# Patient Record
Sex: FEMALE | Race: WHITE | Employment: OTHER | URBAN - METROPOLITAN AREA
[De-identification: names, ages, dates, MRNs, and addresses within clinical notes are randomized per-mention and may not be internally consistent; named-entity substitution may affect disease eponyms.]

---

## 2017-01-20 ENCOUNTER — OFFICE VISIT (OUTPATIENT)
Dept: FAMILY MEDICINE CLINIC | Age: 62
End: 2017-01-20

## 2017-01-20 VITALS
SYSTOLIC BLOOD PRESSURE: 140 MMHG | DIASTOLIC BLOOD PRESSURE: 70 MMHG | RESPIRATION RATE: 16 BRPM | HEIGHT: 61 IN | BODY MASS INDEX: 26.43 KG/M2 | TEMPERATURE: 97.4 F | WEIGHT: 140 LBS | HEART RATE: 70 BPM

## 2017-01-20 DIAGNOSIS — E03.9 HYPOTHYROIDISM, UNSPECIFIED TYPE: ICD-10-CM

## 2017-01-20 DIAGNOSIS — Z12.31 ENCOUNTER FOR SCREENING MAMMOGRAM FOR BREAST CANCER: ICD-10-CM

## 2017-01-20 DIAGNOSIS — I10 ESSENTIAL HYPERTENSION: ICD-10-CM

## 2017-01-20 DIAGNOSIS — Z12.4 CERVICAL CANCER SCREENING: ICD-10-CM

## 2017-01-20 DIAGNOSIS — Z12.4 CERVICAL CANCER SCREENING: Primary | ICD-10-CM

## 2017-01-20 PROCEDURE — 99396 PREV VISIT EST AGE 40-64: CPT | Performed by: FAMILY MEDICINE

## 2017-01-20 RX ORDER — AMMONIUM LACTATE 12 G/100G
LOTION TOPICAL
Qty: 1 BOTTLE | Refills: 3 | Status: SHIPPED | OUTPATIENT
Start: 2017-01-20 | End: 2019-03-18

## 2017-01-20 ASSESSMENT — PATIENT HEALTH QUESTIONNAIRE - PHQ9
2. FEELING DOWN, DEPRESSED OR HOPELESS: 0
1. LITTLE INTEREST OR PLEASURE IN DOING THINGS: 0
SUM OF ALL RESPONSES TO PHQ QUESTIONS 1-9: 0
SUM OF ALL RESPONSES TO PHQ9 QUESTIONS 1 & 2: 0

## 2017-02-01 ENCOUNTER — HOSPITAL ENCOUNTER (OUTPATIENT)
Dept: WOMENS IMAGING | Age: 62
Discharge: HOME | End: 2017-02-01
Payer: COMMERCIAL

## 2017-02-01 DIAGNOSIS — Z12.31 ENCOUNTER FOR SCREENING MAMMOGRAM FOR BREAST CANCER: ICD-10-CM

## 2017-02-01 PROCEDURE — G0202 SCR MAMMO BI INCL CAD: HCPCS

## 2018-03-16 ENCOUNTER — OFFICE VISIT (OUTPATIENT)
Dept: FAMILY MEDICINE CLINIC | Age: 63
End: 2018-03-16
Payer: COMMERCIAL

## 2018-03-16 VITALS
WEIGHT: 157 LBS | SYSTOLIC BLOOD PRESSURE: 132 MMHG | HEART RATE: 76 BPM | BODY MASS INDEX: 29.64 KG/M2 | TEMPERATURE: 97.9 F | HEIGHT: 61 IN | RESPIRATION RATE: 16 BRPM | DIASTOLIC BLOOD PRESSURE: 66 MMHG

## 2018-03-16 DIAGNOSIS — Z12.4 CERVICAL CANCER SCREENING: ICD-10-CM

## 2018-03-16 DIAGNOSIS — I10 ESSENTIAL HYPERTENSION: Primary | ICD-10-CM

## 2018-03-16 DIAGNOSIS — E03.9 HYPOTHYROIDISM, UNSPECIFIED TYPE: ICD-10-CM

## 2018-03-16 DIAGNOSIS — Z12.31 ENCOUNTER FOR SCREENING MAMMOGRAM FOR BREAST CANCER: ICD-10-CM

## 2018-03-16 PROCEDURE — 99396 PREV VISIT EST AGE 40-64: CPT | Performed by: FAMILY MEDICINE

## 2018-03-16 ASSESSMENT — PATIENT HEALTH QUESTIONNAIRE - PHQ9
1. LITTLE INTEREST OR PLEASURE IN DOING THINGS: 0
SUM OF ALL RESPONSES TO PHQ QUESTIONS 1-9: 0
2. FEELING DOWN, DEPRESSED OR HOPELESS: 0
SUM OF ALL RESPONSES TO PHQ9 QUESTIONS 1 & 2: 0

## 2018-03-23 LAB
HPV COMMENT: NORMAL
HPV TYPE 16: NOT DETECTED
HPV TYPE 18: NOT DETECTED
HPVOH (OTHER TYPES): NOT DETECTED

## 2018-03-26 ENCOUNTER — HOSPITAL ENCOUNTER (OUTPATIENT)
Dept: WOMENS IMAGING | Age: 63
Discharge: HOME | End: 2018-03-28
Payer: COMMERCIAL

## 2018-03-26 DIAGNOSIS — Z12.31 ENCOUNTER FOR SCREENING MAMMOGRAM FOR BREAST CANCER: ICD-10-CM

## 2018-03-26 PROCEDURE — 77067 SCR MAMMO BI INCL CAD: CPT

## 2018-03-28 PROCEDURE — 2580000003 HC RX 258

## 2018-11-19 ENCOUNTER — OFFICE VISIT (OUTPATIENT)
Dept: FAMILY MEDICINE CLINIC | Age: 63
End: 2018-11-19
Payer: COMMERCIAL

## 2018-11-19 VITALS
TEMPERATURE: 98.2 F | BODY MASS INDEX: 26.06 KG/M2 | HEIGHT: 61 IN | OXYGEN SATURATION: 98 % | RESPIRATION RATE: 15 BRPM | HEART RATE: 77 BPM | SYSTOLIC BLOOD PRESSURE: 126 MMHG | WEIGHT: 138 LBS | DIASTOLIC BLOOD PRESSURE: 74 MMHG

## 2018-11-19 DIAGNOSIS — J40 BRONCHITIS: Primary | ICD-10-CM

## 2018-11-19 PROCEDURE — G8484 FLU IMMUNIZE NO ADMIN: HCPCS | Performed by: NURSE PRACTITIONER

## 2018-11-19 PROCEDURE — 3017F COLORECTAL CA SCREEN DOC REV: CPT | Performed by: NURSE PRACTITIONER

## 2018-11-19 PROCEDURE — 1036F TOBACCO NON-USER: CPT | Performed by: NURSE PRACTITIONER

## 2018-11-19 PROCEDURE — G8427 DOCREV CUR MEDS BY ELIG CLIN: HCPCS | Performed by: NURSE PRACTITIONER

## 2018-11-19 PROCEDURE — 99213 OFFICE O/P EST LOW 20 MIN: CPT | Performed by: NURSE PRACTITIONER

## 2018-11-19 PROCEDURE — G8419 CALC BMI OUT NRM PARAM NOF/U: HCPCS | Performed by: NURSE PRACTITIONER

## 2018-11-19 RX ORDER — CEFDINIR 300 MG/1
600 CAPSULE ORAL DAILY
Qty: 20 CAPSULE | Refills: 0 | Status: SHIPPED | OUTPATIENT
Start: 2018-11-19 | End: 2018-11-29

## 2018-11-19 RX ORDER — DEXTROMETHORPHAN HYDROBROMIDE AND PROMETHAZINE HYDROCHLORIDE 15; 6.25 MG/5ML; MG/5ML
5 SYRUP ORAL 4 TIMES DAILY PRN
Qty: 118 ML | Refills: 0 | Status: SHIPPED | OUTPATIENT
Start: 2018-11-19 | End: 2018-11-26

## 2018-11-19 RX ORDER — PREDNISONE 20 MG/1
40 TABLET ORAL DAILY
Qty: 10 TABLET | Refills: 0 | Status: SHIPPED | OUTPATIENT
Start: 2018-11-19 | End: 2018-11-24

## 2018-11-25 ASSESSMENT — ENCOUNTER SYMPTOMS
HEMOPTYSIS: 0
SHORTNESS OF BREATH: 0
HEARTBURN: 0
WHEEZING: 1
SORE THROAT: 1
RHINORRHEA: 0
COUGH: 1

## 2019-03-18 ENCOUNTER — OFFICE VISIT (OUTPATIENT)
Dept: FAMILY MEDICINE CLINIC | Age: 64
End: 2019-03-18
Payer: COMMERCIAL

## 2019-03-18 VITALS
BODY MASS INDEX: 26.81 KG/M2 | RESPIRATION RATE: 16 BRPM | DIASTOLIC BLOOD PRESSURE: 78 MMHG | HEIGHT: 61 IN | TEMPERATURE: 98.1 F | SYSTOLIC BLOOD PRESSURE: 166 MMHG | HEART RATE: 84 BPM | WEIGHT: 142 LBS

## 2019-03-18 DIAGNOSIS — Z12.31 ENCOUNTER FOR SCREENING MAMMOGRAM FOR BREAST CANCER: ICD-10-CM

## 2019-03-18 DIAGNOSIS — I10 ESSENTIAL HYPERTENSION: Primary | ICD-10-CM

## 2019-03-18 DIAGNOSIS — E03.9 HYPOTHYROIDISM, UNSPECIFIED TYPE: ICD-10-CM

## 2019-03-18 DIAGNOSIS — Z12.4 CERVICAL CANCER SCREENING: ICD-10-CM

## 2019-03-18 DIAGNOSIS — E55.9 VITAMIN D DEFICIENCY: ICD-10-CM

## 2019-03-18 DIAGNOSIS — R73.9 HYPERGLYCEMIA: ICD-10-CM

## 2019-03-18 PROCEDURE — 99396 PREV VISIT EST AGE 40-64: CPT | Performed by: FAMILY MEDICINE

## 2019-03-18 PROCEDURE — G8484 FLU IMMUNIZE NO ADMIN: HCPCS | Performed by: FAMILY MEDICINE

## 2019-03-18 RX ORDER — MULTIVIT-MIN/IRON/FOLIC ACID/K 18-600-40
CAPSULE ORAL
COMMUNITY

## 2019-03-19 DIAGNOSIS — E55.9 VITAMIN D DEFICIENCY: ICD-10-CM

## 2019-03-19 LAB — VITAMIN D 25-HYDROXY: 36.9 NG/ML (ref 30–100)

## 2019-03-29 ENCOUNTER — TELEPHONE (OUTPATIENT)
Dept: FAMILY MEDICINE CLINIC | Age: 64
End: 2019-03-29

## 2019-03-29 ENCOUNTER — HOSPITAL ENCOUNTER (OUTPATIENT)
Dept: WOMENS IMAGING | Age: 64
Discharge: HOME | End: 2019-03-31
Payer: COMMERCIAL

## 2019-03-29 DIAGNOSIS — Z12.31 ENCOUNTER FOR SCREENING MAMMOGRAM FOR BREAST CANCER: ICD-10-CM

## 2019-03-29 PROCEDURE — 77067 SCR MAMMO BI INCL CAD: CPT

## 2019-09-13 ENCOUNTER — TELEPHONE (OUTPATIENT)
Dept: ADMINISTRATIVE | Age: 64
End: 2019-09-13

## 2020-07-08 ENCOUNTER — HOSPITAL ENCOUNTER (OUTPATIENT)
Dept: WOMENS IMAGING | Age: 65
Discharge: HOME | End: 2020-07-10
Payer: MEDICARE

## 2020-07-08 PROCEDURE — 77067 SCR MAMMO BI INCL CAD: CPT

## 2021-08-14 ENCOUNTER — HOSPITAL ENCOUNTER (OUTPATIENT)
Dept: WOMENS IMAGING | Age: 66
Discharge: HOME | End: 2021-08-16
Payer: MEDICARE

## 2021-08-14 VITALS — HEIGHT: 61 IN | BODY MASS INDEX: 26.83 KG/M2

## 2021-08-14 DIAGNOSIS — Z12.31 SCREENING MAMMOGRAM FOR HIGH-RISK PATIENT: ICD-10-CM

## 2021-08-14 DIAGNOSIS — Z12.31 ENCOUNTER FOR SCREENING MAMMOGRAM FOR BREAST CANCER: ICD-10-CM

## 2021-08-14 PROCEDURE — 77063 BREAST TOMOSYNTHESIS BI: CPT

## 2022-08-15 ENCOUNTER — HOSPITAL ENCOUNTER (OUTPATIENT)
Dept: WOMENS IMAGING | Age: 67
Discharge: HOME | End: 2022-08-17
Payer: MEDICARE

## 2022-08-15 VITALS — HEIGHT: 61 IN | BODY MASS INDEX: 26.83 KG/M2

## 2022-08-15 DIAGNOSIS — Z12.31 ENCOUNTER FOR SCREENING MAMMOGRAM FOR MALIGNANT NEOPLASM OF BREAST: ICD-10-CM

## 2022-08-15 PROCEDURE — 77067 SCR MAMMO BI INCL CAD: CPT

## 2022-12-12 ENCOUNTER — HOSPITAL ENCOUNTER (OUTPATIENT)
Age: 67
Setting detail: OUTPATIENT SURGERY
Discharge: HOME | End: 2022-12-12
Attending: SPECIALIST | Admitting: SPECIALIST
Payer: MEDICARE

## 2022-12-12 ENCOUNTER — ANESTHESIA EVENT (OUTPATIENT)
Dept: ENDOSCOPY | Age: 67
End: 2022-12-12
Payer: MEDICARE

## 2022-12-12 ENCOUNTER — ANESTHESIA (OUTPATIENT)
Dept: ENDOSCOPY | Age: 67
End: 2022-12-12
Payer: MEDICARE

## 2022-12-12 VITALS
TEMPERATURE: 96.9 F | HEIGHT: 61 IN | BODY MASS INDEX: 26.06 KG/M2 | HEART RATE: 81 BPM | SYSTOLIC BLOOD PRESSURE: 98 MMHG | OXYGEN SATURATION: 94 % | DIASTOLIC BLOOD PRESSURE: 57 MMHG | WEIGHT: 138 LBS | RESPIRATION RATE: 16 BRPM

## 2022-12-12 DIAGNOSIS — Z86.010 HISTORY OF COLON POLYPS: ICD-10-CM

## 2022-12-12 PROBLEM — D12.2 ADENOMATOUS POLYP OF ASCENDING COLON: Status: ACTIVE | Noted: 2022-12-12

## 2022-12-12 PROCEDURE — 6370000000 HC RX 637 (ALT 250 FOR IP): Performed by: SPECIALIST

## 2022-12-12 PROCEDURE — 6360000002 HC RX W HCPCS: Performed by: NURSE ANESTHETIST, CERTIFIED REGISTERED

## 2022-12-12 PROCEDURE — 3700000001 HC ADD 15 MINUTES (ANESTHESIA): Performed by: SPECIALIST

## 2022-12-12 PROCEDURE — 2580000003 HC RX 258: Performed by: SPECIALIST

## 2022-12-12 PROCEDURE — 2500000003 HC RX 250 WO HCPCS: Performed by: NURSE ANESTHETIST, CERTIFIED REGISTERED

## 2022-12-12 PROCEDURE — 3609027000 HC COLONOSCOPY: Performed by: SPECIALIST

## 2022-12-12 PROCEDURE — 2709999900 HC NON-CHARGEABLE SUPPLY: Performed by: SPECIALIST

## 2022-12-12 PROCEDURE — 7100000010 HC PHASE II RECOVERY - FIRST 15 MIN: Performed by: SPECIALIST

## 2022-12-12 PROCEDURE — 7100000011 HC PHASE II RECOVERY - ADDTL 15 MIN: Performed by: SPECIALIST

## 2022-12-12 PROCEDURE — 45380 COLONOSCOPY AND BIOPSY: CPT | Performed by: SPECIALIST

## 2022-12-12 PROCEDURE — 88305 TISSUE EXAM BY PATHOLOGIST: CPT

## 2022-12-12 PROCEDURE — 3700000000 HC ANESTHESIA ATTENDED CARE: Performed by: SPECIALIST

## 2022-12-12 PROCEDURE — 2580000003 HC RX 258

## 2022-12-12 RX ORDER — SODIUM CHLORIDE 9 MG/ML
INJECTION, SOLUTION INTRAVENOUS
Status: COMPLETED
Start: 2022-12-12 | End: 2022-12-12

## 2022-12-12 RX ORDER — METOPROLOL SUCCINATE 100 MG/1
TABLET, EXTENDED RELEASE ORAL
COMMUNITY
Start: 2019-09-27

## 2022-12-12 RX ORDER — ATORVASTATIN CALCIUM 10 MG/1
TABLET, FILM COATED ORAL
COMMUNITY
Start: 2019-09-27

## 2022-12-12 RX ORDER — SPIRONOLACTONE 25 MG/1
TABLET ORAL
COMMUNITY
Start: 2019-12-02

## 2022-12-12 RX ORDER — SIMETHICONE 20 MG/.3ML
EMULSION ORAL PRN
Status: DISCONTINUED | OUTPATIENT
Start: 2022-12-12 | End: 2022-12-12 | Stop reason: ALTCHOICE

## 2022-12-12 RX ORDER — SODIUM CHLORIDE 9 MG/ML
INJECTION, SOLUTION INTRAVENOUS CONTINUOUS
Status: DISCONTINUED | OUTPATIENT
Start: 2022-12-12 | End: 2022-12-12 | Stop reason: HOSPADM

## 2022-12-12 RX ORDER — LIDOCAINE HYDROCHLORIDE 20 MG/ML
INJECTION, SOLUTION INFILTRATION; PERINEURAL PRN
Status: DISCONTINUED | OUTPATIENT
Start: 2022-12-12 | End: 2022-12-12 | Stop reason: SDUPTHER

## 2022-12-12 RX ORDER — PROPOFOL 10 MG/ML
INJECTION, EMULSION INTRAVENOUS PRN
Status: DISCONTINUED | OUTPATIENT
Start: 2022-12-12 | End: 2022-12-12 | Stop reason: SDUPTHER

## 2022-12-12 RX ORDER — MAGNESIUM HYDROXIDE 1200 MG/15ML
LIQUID ORAL PRN
Status: DISCONTINUED | OUTPATIENT
Start: 2022-12-12 | End: 2022-12-12 | Stop reason: ALTCHOICE

## 2022-12-12 RX ADMIN — PROPOFOL 450 MG: 10 INJECTION, EMULSION INTRAVENOUS at 07:39

## 2022-12-12 RX ADMIN — SODIUM CHLORIDE: 9 INJECTION, SOLUTION INTRAVENOUS at 07:08

## 2022-12-12 RX ADMIN — LIDOCAINE HYDROCHLORIDE 60 MG: 20 INJECTION, SOLUTION INFILTRATION; PERINEURAL at 07:33

## 2022-12-12 ASSESSMENT — PAIN - FUNCTIONAL ASSESSMENT: PAIN_FUNCTIONAL_ASSESSMENT: NONE - DENIES PAIN

## 2022-12-12 NOTE — ANESTHESIA PRE PROCEDURE
Department of Anesthesiology  Preprocedure Note       Name:  Milbert Sever   Age:  75 y.o.  :  1955                                          MRN:  [id number]         Date:  2022      Surgeon: Felipe Montana):  Dani Haines MD    Procedure: Procedure(s):  COLORECTAL CANCER SCREENING, HIGH RISK    Medications prior to admission:   Prior to Admission medications    Medication Sig Start Date End Date Taking? Authorizing Provider   Cholecalciferol (VITAMIN D) 2000 units CAPS capsule Take by mouth    Historical Provider, MD   levothyroxine (SYNTHROID) 88 MCG tablet  14   Historical Provider, MD   vitamin B-12 (CYANOCOBALAMIN) 1000 MCG tablet Take 1,000 mcg by mouth daily. Patient not taking: Reported on 2022    Historical Provider, MD   Multiple Vitamin (MULTIVITAMIN PO) Take  by mouth. Patient not taking: Reported on 2022    Historical Provider, MD   Calcium Carbonate (CALCIUM 600 PO) Take  by mouth. Patient not taking: Reported on 2022    Historical Provider, MD       Current medications:    Current Facility-Administered Medications   Medication Dose Route Frequency Provider Last Rate Last Admin    0.9 % sodium chloride infusion   IntraVENous Continuous Dani Haines  mL/hr at 22 0708 New Bag at 22 0708       Allergies:  No Known Allergies    Problem List:    Patient Active Problem List   Diagnosis Code    Hypothyroidism E03.9    Environmental allergies     Asthma J45.909    HTN (hypertension) I10       Past Medical History:        Diagnosis Date    Asthma     Colon polyps     Environmental allergies     History of therapeutic radiation     Hypothyroidism        Past Surgical History:        Procedure Laterality Date    CARPAL TUNNEL RELEASE      CERVIX SURGERY  ?     COLONOSCOPY  6737,8920,74    q 5 yrs LISSETH- hx polyps,' nxt    FINGER TRIGGER RELEASE         Social History:    Social History     Tobacco Use    Smoking status: Never    Smokeless tobacco: Never   Substance Use Topics    Alcohol use: Yes     Comment: ocassional                                Counseling given: Not Answered      Vital Signs (Current):   Vitals:    12/12/22 0701   BP: (!) 164/81   Pulse: 82   Resp: 16   Temp: 36.1 °C (96.9 °F)   TempSrc: Temporal   SpO2: 97%   Weight: 138 lb (62.6 kg)   Height: 5' 1\" (1.549 m)                                              BP Readings from Last 3 Encounters:   12/12/22 (!) 164/81   03/18/19 (!) 166/78   11/19/18 126/74       NPO Status: Time of last liquid consumption: 2245                        Time of last solid consumption: 2200                        Date of last liquid consumption: 12/11/22                        Date of last solid food consumption: 12/10/22    BMI:   Wt Readings from Last 3 Encounters:   12/12/22 138 lb (62.6 kg)   03/18/19 142 lb (64.4 kg)   11/19/18 138 lb (62.6 kg)     Body mass index is 26.07 kg/m². CBC:   Lab Results   Component Value Date/Time    WBC 8.1 02/16/2015 11:35 AM    RBC 4.72 02/16/2015 11:35 AM    HGB 14.3 02/16/2015 11:35 AM    HCT 42.8 02/16/2015 11:35 AM    MCV 90.7 02/16/2015 11:35 AM    RDW 13.3 02/16/2015 11:35 AM     02/16/2015 11:35 AM       CMP:   Lab Results   Component Value Date/Time     06/15/2022 10:55 AM    K 4.3 06/15/2022 10:55 AM     06/15/2022 10:55 AM    CO2 23 06/15/2022 10:55 AM    BUN 18 06/15/2022 10:55 AM    CREATININE 0.94 06/15/2022 10:55 AM    GFRAA >60.0 06/15/2022 10:55 AM    LABGLOM 59.3 06/15/2022 10:55 AM    GLUCOSE 106 06/15/2022 10:55 AM    PROT 6.8 06/15/2022 10:55 AM    CALCIUM 9.4 06/15/2022 10:55 AM    BILITOT 0.5 06/15/2022 10:55 AM    ALKPHOS 99 06/15/2022 10:55 AM    AST 22 06/15/2022 10:55 AM    ALT 20 06/15/2022 10:55 AM       POC Tests: No results for input(s): POCGLU, POCNA, POCK, POCCL, POCBUN, POCHEMO, POCHCT in the last 72 hours. Coags: No results found for: PROTIME, INR, APTT    HCG (If Applicable):  No results found for: PREGTESTUR, PREGSERUM, HCG, HCGQUANT     ABGs: No results found for: PHART, PO2ART, KWN5TEJ, FUA4CKD, BEART, L8MKAIPN     Type & Screen (If Applicable):  No results found for: LABABO, LABRH    Drug/Infectious Status (If Applicable):  No results found for: HIV, HEPCAB    COVID-19 Screening (If Applicable): No results found for: COVID19        Anesthesia Evaluation    Airway: Mallampati: II          Dental: normal exam         Pulmonary:normal exam    (+) asthma:                            Cardiovascular:Negative CV ROS  Exercise tolerance: good (>4 METS),            Beta Blocker:  Not on Beta Blocker         Neuro/Psych:   Negative Neuro/Psych ROS              GI/Hepatic/Renal:   (+) bowel prep,           Endo/Other:    (+) hypothyroidism::., .          Pt had no PAT visit       Abdominal:             Vascular: negative vascular ROS. Other Findings:           Anesthesia Plan      MAC     ASA 2       Induction: intravenous. Anesthetic plan and risks discussed with patient. Plan discussed with surgical team and attending.                     GEE Soria - CRNA   12/12/2022

## 2022-12-12 NOTE — DISCHARGE INSTRUCTIONS
Lower Discharge Instructions    Patient Name: Whitney Lombardi  Patient ID: [id number]  YOB: 1955  Procedure: Brenda Valdes  Referring Physician: [unfilled]  Procedure Date: 12/12/2022    Recommendations:  []   Follow-up appointment with family physician in 4 weeks. []   Colonoscopy recommended in 5  years. []   Follow-up appointment with endoscopist in  Reports of your procedure and these recommendations have been sent to:  [unfilled]    Sedative medication given for procedures can slow your reaction time and coordination for many hours. If you receive medications, it is important for your safety to follow the instructions below for the remainder of the day:  BE TAKEN directly home from the center and rest quietly. DO NOT resume normal activities until tomorrow. Do NOT drive, return to work, or operate any machinery or power tools. Do NOT make any important personal or business decisions, sign any legal papers or perform any activity that depends on your full concentration power or mental judgement. Do NOT drink any alcohol or take nerve or sleeping drugs. They add to the effects of the medicine still present in your body.    [] (Checked if a biopsy or polyp removal was performed)  There is a slight risk of bleeding. If you had a biopsy or a polyp removed, we suggest that you follow the instructions below:  Do NOT take aspirin or similar anti-inflammatory medicine for ___ days. Do NOT exercise, jog, or do any heavy lifting or straining for 1 day. Potential common after effects and treatment following the procedure:  Mild abdominal pain, bloating, or excessive gas - rest, eat lightly, and use a heating pad. Redness and/or swelling where the IV was - apply heat and elevate. Symptoms to report to your physician:  Severe abdominal pain or bloating. Chills or fever above 101 degrees occurring within 24 hours after procedure. Large amounts of rectal bleeding that does not stop.  Small amount of rectal bleeding is not serious especially if hemorrhoids are present. Unable to keep down food or drink. IV site stays red and swollen for more than 2 days. In the case of an emergency, please go to the emergency room. If you are not having an emergency but are having some of the above symptoms please call the doctor's office at:  Dr. Velia Walker [phone]   @PAD@      I have read and understand the above instructions:            ____________________________         ____________________________  Patient or Patient Rep. Signature   Witness Signature      Date: 12/12/2022  Time:

## 2022-12-12 NOTE — H&P
Patient Name: Joseph Chowdhury  : 1955  MRN: [id number]  DATE: 22      ENDOSCOPY  History and Physical    Procedure:    [x] Diagnostic Colonoscopy       [] Screening Colonoscopy  [] EGD      [] ERCP      [] EUS       [] Other    [x] Previous office notes/History and Physical reviewed from the patients chart. Please see EMR for further details of HPI. I have examined the patient's status immediately prior to the procedure and:      Indications/HPI:    []Abdominal Pain  []Cancer- GI/Lung  []Fhx of colon CA/polyps  []History of Polyps  []Garrisons   []Melena  []Abnormal Imaging  []Dysphagia    []Persistent Pneumonia  []Anemia  []Food Impaction  [x]History of Polyps  []GI Bleed  []Pulmonary nodule/Mass  []Change in bowel habits []Heartburn/Reflux  []Rectal Bleed (BRBPR)  []Chest Pain - Non Cardiac []Heme (+) Stoo  l[]Ulcers  []Constipation  []Hemoptysis   []Varices  []Diarrhea  []Hypoxemia  []Nausea/Vomiting  []Screening   []Crohns/Colitis  []Other:     Anesthesia:   [x] MAC [] Moderate Sedation   [] General   [] None     ROS: 12 pt Review of Symptoms was negative unless mentioned above    Medications:   Prior to Admission medications    Medication Sig Start Date End Date Taking? Authorizing Provider   Cholecalciferol (VITAMIN D) 2000 units CAPS capsule Take by mouth    Historical Provider, MD   levothyroxine (SYNTHROID) 88 MCG tablet  14   Historical Provider, MD   vitamin B-12 (CYANOCOBALAMIN) 1000 MCG tablet Take 1,000 mcg by mouth daily. Patient not taking: Reported on 2022    Historical Provider, MD   Multiple Vitamin (MULTIVITAMIN PO) Take  by mouth. Patient not taking: Reported on 2022    Historical Provider, MD   Calcium Carbonate (CALCIUM 600 PO) Take  by mouth.     Patient not taking: Reported on 2022    Historical Provider, MD       Allergies: No Known Allergies     History of allergic reaction to anesthesia:  No    Past Medical History:  Past Medical History: Diagnosis Date    Asthma     Colon polyps     Environmental allergies     History of therapeutic radiation     Hypothyroidism        Past Surgical History:  Past Surgical History:   Procedure Laterality Date    CARPAL TUNNEL RELEASE  1999    CERVIX SURGERY  2000s? COLONOSCOPY  2009,2015,17    q 5 yrs LISSETH- hx polyps,'2022 nxt    FINGER TRIGGER RELEASE  2004       Social History:  Social History     Tobacco Use    Smoking status: Never    Smokeless tobacco: Never   Vaping Use    Vaping Use: Never used   Substance Use Topics    Alcohol use: Yes     Comment: ocassional    Drug use: Never       Vital Signs:   Vitals:    12/12/22 0701   BP: (!) 164/81   Pulse: 82   Resp: 16   Temp: 96.9 °F (36.1 °C)   SpO2: 97%        Physical Exam:  Cardiac:  [x]WNL  []Comments:  Pulmonary:  [x]WNL   []Comments:   Neuro/Mental Status:  [x]WNL  []Comments:  Abdominal:  [x]WNL    []Comments:  Other:   []WNL  []Comments:    Informed Consent:  The risks and benefits of the procedure have been discussed with either the patient or if they cannot consent, their representative. Assessment:  Patient examined and appropriate for planned sedation and procedure. Plan:  Proceed with planned sedation and procedure as above.     Dave Simons MD  7:35 AM

## 2022-12-12 NOTE — ANESTHESIA POSTPROCEDURE EVALUATION
Department of Anesthesiology  Postprocedure Note    Patient: Jayce Norwood  MRN: [id number]  YOB: 1955  Date of evaluation: 12/12/2022      Procedure Summary     Date: 12/12/22 Room / Location: [facility] OR [address] [place] [address]    Anesthesia Start: 0337 Anesthesia Stop: 2647    Procedure: Colonoscopy with polypectomy Diagnosis:       History of colon polyps      (History of colon polyps [Z86.010])    Surgeons: David Forbes MD Responsible Provider: GEE Freedman CRNA    Anesthesia Type: MAC ASA Status: 2          Anesthesia Type: No value filed.     Az Phase I:      Az Phase II:        Anesthesia Post Evaluation    Patient location during evaluation: bedside  Patient participation: complete - patient participated  Level of consciousness: awake and awake and alert  Airway patency: patent  Nausea & Vomiting: no nausea and no vomiting  Complications: no  Cardiovascular status: blood pressure returned to baseline and hemodynamically stable  Respiratory status: acceptable  Hydration status: euvolemic

## 2023-07-05 ENCOUNTER — TELEPHONE (OUTPATIENT)
Dept: PRIMARY CARE | Facility: CLINIC | Age: 68
End: 2023-07-05
Payer: MEDICARE

## 2023-07-05 DIAGNOSIS — I10 HYPERTENSION, UNSPECIFIED TYPE: ICD-10-CM

## 2023-07-05 DIAGNOSIS — R73.9 HYPERGLYCEMIA: ICD-10-CM

## 2023-07-05 DIAGNOSIS — E03.9 HYPOTHYROIDISM, UNSPECIFIED TYPE: ICD-10-CM

## 2023-07-05 DIAGNOSIS — E78.5 DYSLIPIDEMIA: ICD-10-CM

## 2023-07-05 NOTE — TELEPHONE ENCOUNTER
Pt states that she has an appt on 7/21/23 and needs lab orders placed in the system. Pt is not diabetic but on thyroid meds. Please place orders for pt

## 2023-07-14 PROBLEM — R73.9 HYPERGLYCEMIA: Status: ACTIVE | Noted: 2023-07-14

## 2023-07-14 PROBLEM — I10 ESSENTIAL HYPERTENSION: Status: ACTIVE | Noted: 2023-07-14

## 2023-07-14 PROBLEM — E78.5 DYSLIPIDEMIA: Status: ACTIVE | Noted: 2023-07-14

## 2023-07-14 PROBLEM — E03.8 OTHER SPECIFIED HYPOTHYROIDISM: Status: ACTIVE | Noted: 2023-07-14

## 2023-07-14 PROBLEM — J30.9 ALLERGIC RHINITIS: Status: ACTIVE | Noted: 2023-07-14

## 2023-07-14 PROBLEM — R05.3 CHRONIC COUGH: Status: ACTIVE | Noted: 2023-07-14

## 2023-07-17 ENCOUNTER — LAB (OUTPATIENT)
Dept: LAB | Facility: LAB | Age: 68
End: 2023-07-17
Payer: MEDICARE

## 2023-07-17 DIAGNOSIS — I10 HYPERTENSION, UNSPECIFIED TYPE: ICD-10-CM

## 2023-07-17 DIAGNOSIS — E03.9 HYPOTHYROIDISM, UNSPECIFIED TYPE: ICD-10-CM

## 2023-07-17 DIAGNOSIS — R73.9 HYPERGLYCEMIA: ICD-10-CM

## 2023-07-17 DIAGNOSIS — E78.5 DYSLIPIDEMIA: ICD-10-CM

## 2023-07-17 LAB
ALANINE AMINOTRANSFERASE (SGPT) (U/L) IN SER/PLAS: 21 U/L (ref 7–45)
ALBUMIN (G/DL) IN SER/PLAS: 4.2 G/DL (ref 3.4–5)
ALKALINE PHOSPHATASE (U/L) IN SER/PLAS: 85 U/L (ref 33–136)
ANION GAP IN SER/PLAS: 11 MMOL/L (ref 10–20)
ASPARTATE AMINOTRANSFERASE (SGOT) (U/L) IN SER/PLAS: 23 U/L (ref 9–39)
BILIRUBIN TOTAL (MG/DL) IN SER/PLAS: 0.7 MG/DL (ref 0–1.2)
CALCIUM (MG/DL) IN SER/PLAS: 9.2 MG/DL (ref 8.6–10.3)
CARBON DIOXIDE, TOTAL (MMOL/L) IN SER/PLAS: 29 MMOL/L (ref 21–32)
CHLORIDE (MMOL/L) IN SER/PLAS: 106 MMOL/L (ref 98–107)
CHOLESTEROL (MG/DL) IN SER/PLAS: 139 MG/DL (ref 0–199)
CHOLESTEROL IN HDL (MG/DL) IN SER/PLAS: 41.7 MG/DL
CHOLESTEROL/HDL RATIO: 3.3
CREATININE (MG/DL) IN SER/PLAS: 0.9 MG/DL (ref 0.5–1.05)
ERYTHROCYTE DISTRIBUTION WIDTH (RATIO) BY AUTOMATED COUNT: 13 % (ref 11.5–14.5)
ERYTHROCYTE MEAN CORPUSCULAR HEMOGLOBIN CONCENTRATION (G/DL) BY AUTOMATED: 31.9 G/DL (ref 32–36)
ERYTHROCYTE MEAN CORPUSCULAR VOLUME (FL) BY AUTOMATED COUNT: 94 FL (ref 80–100)
ERYTHROCYTES (10*6/UL) IN BLOOD BY AUTOMATED COUNT: 4.45 X10E12/L (ref 4–5.2)
ESTIMATED AVERAGE GLUCOSE FOR HBA1C: 117 MG/DL
GFR FEMALE: 69 ML/MIN/1.73M2
GLUCOSE (MG/DL) IN SER/PLAS: 102 MG/DL (ref 74–99)
HEMATOCRIT (%) IN BLOOD BY AUTOMATED COUNT: 41.7 % (ref 36–46)
HEMOGLOBIN (G/DL) IN BLOOD: 13.3 G/DL (ref 12–16)
HEMOGLOBIN A1C/HEMOGLOBIN TOTAL IN BLOOD: 5.7 %
LDL: 58 MG/DL (ref 0–99)
LEUKOCYTES (10*3/UL) IN BLOOD BY AUTOMATED COUNT: 6.8 X10E9/L (ref 4.4–11.3)
PLATELETS (10*3/UL) IN BLOOD AUTOMATED COUNT: 278 X10E9/L (ref 150–450)
POTASSIUM (MMOL/L) IN SER/PLAS: 5.1 MMOL/L (ref 3.5–5.3)
PROTEIN TOTAL: 6.7 G/DL (ref 6.4–8.2)
SODIUM (MMOL/L) IN SER/PLAS: 141 MMOL/L (ref 136–145)
THYROTROPIN (MIU/L) IN SER/PLAS BY DETECTION LIMIT <= 0.05 MIU/L: 1.34 MIU/L (ref 0.44–3.98)
THYROXINE (T4) FREE (NG/DL) IN SER/PLAS: 1.03 NG/DL (ref 0.61–1.12)
TRIGLYCERIDE (MG/DL) IN SER/PLAS: 195 MG/DL (ref 0–149)
UREA NITROGEN (MG/DL) IN SER/PLAS: 17 MG/DL (ref 6–23)
VLDL: 39 MG/DL (ref 0–40)

## 2023-07-17 PROCEDURE — 84443 ASSAY THYROID STIM HORMONE: CPT

## 2023-07-17 PROCEDURE — 80053 COMPREHEN METABOLIC PANEL: CPT

## 2023-07-17 PROCEDURE — 83036 HEMOGLOBIN GLYCOSYLATED A1C: CPT

## 2023-07-17 PROCEDURE — 80061 LIPID PANEL: CPT

## 2023-07-17 PROCEDURE — 36415 COLL VENOUS BLD VENIPUNCTURE: CPT

## 2023-07-17 PROCEDURE — 85027 COMPLETE CBC AUTOMATED: CPT

## 2023-07-17 PROCEDURE — 84439 ASSAY OF FREE THYROXINE: CPT

## 2023-07-21 ENCOUNTER — OFFICE VISIT (OUTPATIENT)
Dept: PRIMARY CARE | Facility: CLINIC | Age: 68
End: 2023-07-21
Payer: MEDICARE

## 2023-07-21 VITALS
BODY MASS INDEX: 27.19 KG/M2 | DIASTOLIC BLOOD PRESSURE: 64 MMHG | SYSTOLIC BLOOD PRESSURE: 122 MMHG | OXYGEN SATURATION: 98 % | HEART RATE: 58 BPM | WEIGHT: 144 LBS | HEIGHT: 61 IN | RESPIRATION RATE: 16 BRPM | TEMPERATURE: 97 F

## 2023-07-21 DIAGNOSIS — E03.8 OTHER SPECIFIED HYPOTHYROIDISM: ICD-10-CM

## 2023-07-21 DIAGNOSIS — E78.5 DYSLIPIDEMIA: ICD-10-CM

## 2023-07-21 DIAGNOSIS — Z00.00 ENCOUNTER FOR MEDICARE ANNUAL WELLNESS EXAM: ICD-10-CM

## 2023-07-21 DIAGNOSIS — I10 ESSENTIAL HYPERTENSION: ICD-10-CM

## 2023-07-21 DIAGNOSIS — M85.80 OSTEOPENIA, UNSPECIFIED LOCATION: ICD-10-CM

## 2023-07-21 DIAGNOSIS — Z00.00 ROUTINE GENERAL MEDICAL EXAMINATION AT HEALTH CARE FACILITY: Primary | ICD-10-CM

## 2023-07-21 DIAGNOSIS — Z12.31 ENCOUNTER FOR SCREENING MAMMOGRAM FOR MALIGNANT NEOPLASM OF BREAST: ICD-10-CM

## 2023-07-21 DIAGNOSIS — R73.9 HYPERGLYCEMIA: ICD-10-CM

## 2023-07-21 PROBLEM — D12.2 ADENOMATOUS POLYP OF ASCENDING COLON: Status: ACTIVE | Noted: 2022-12-12

## 2023-07-21 PROCEDURE — 1036F TOBACCO NON-USER: CPT | Performed by: FAMILY MEDICINE

## 2023-07-21 PROCEDURE — G0439 PPPS, SUBSEQ VISIT: HCPCS | Performed by: FAMILY MEDICINE

## 2023-07-21 PROCEDURE — 1160F RVW MEDS BY RX/DR IN RCRD: CPT | Performed by: FAMILY MEDICINE

## 2023-07-21 PROCEDURE — 1159F MED LIST DOCD IN RCRD: CPT | Performed by: FAMILY MEDICINE

## 2023-07-21 PROCEDURE — 3078F DIAST BP <80 MM HG: CPT | Performed by: FAMILY MEDICINE

## 2023-07-21 PROCEDURE — 3074F SYST BP LT 130 MM HG: CPT | Performed by: FAMILY MEDICINE

## 2023-07-21 PROCEDURE — 1170F FXNL STATUS ASSESSED: CPT | Performed by: FAMILY MEDICINE

## 2023-07-21 PROCEDURE — 99213 OFFICE O/P EST LOW 20 MIN: CPT | Performed by: FAMILY MEDICINE

## 2023-07-21 RX ORDER — MULTIVITAMIN
TABLET ORAL
COMMUNITY
Start: 2010-08-13

## 2023-07-21 RX ORDER — LANOLIN ALCOHOL/MO/W.PET/CERES
1000 CREAM (GRAM) TOPICAL DAILY
COMMUNITY
End: 2023-07-21 | Stop reason: ALTCHOICE

## 2023-07-21 RX ORDER — SPIRONOLACTONE 25 MG/1
TABLET ORAL
COMMUNITY
Start: 2019-12-02 | End: 2023-09-06

## 2023-07-21 RX ORDER — CALCIUM CARBONATE/VITAMIN D3 600MG-5MCG
TABLET ORAL
COMMUNITY
Start: 2007-04-27 | End: 2023-07-21 | Stop reason: ALTCHOICE

## 2023-07-21 RX ORDER — HYDROQUINONE 40 MG/G
CREAM TOPICAL
COMMUNITY
Start: 2023-03-10

## 2023-07-21 RX ORDER — LEVOTHYROXINE SODIUM 88 UG/1
TABLET ORAL
COMMUNITY
Start: 2014-11-26 | End: 2023-09-06

## 2023-07-21 RX ORDER — CETIRIZINE HYDROCHLORIDE 10 MG/1
1 TABLET ORAL DAILY
COMMUNITY
Start: 2020-03-12 | End: 2023-07-21 | Stop reason: ALTCHOICE

## 2023-07-21 RX ORDER — ACETAMINOPHEN 500 MG
TABLET ORAL
COMMUNITY
End: 2023-07-21 | Stop reason: ALTCHOICE

## 2023-07-21 RX ORDER — METOPROLOL SUCCINATE 100 MG/1
TABLET, EXTENDED RELEASE ORAL
COMMUNITY
Start: 2019-09-27 | End: 2023-09-06

## 2023-07-21 RX ORDER — ATORVASTATIN CALCIUM 10 MG/1
TABLET, FILM COATED ORAL
COMMUNITY
Start: 2019-09-27 | End: 2023-09-06

## 2023-07-21 RX ORDER — ALBUTEROL SULFATE 90 UG/1
2 AEROSOL, METERED RESPIRATORY (INHALATION) EVERY 6 HOURS PRN
COMMUNITY
Start: 2018-01-27

## 2023-07-21 ASSESSMENT — ENCOUNTER SYMPTOMS
DEPRESSION: 0
LOSS OF SENSATION IN FEET: 0
OCCASIONAL FEELINGS OF UNSTEADINESS: 0

## 2023-07-21 ASSESSMENT — ACTIVITIES OF DAILY LIVING (ADL)
TAKING_MEDICATION: INDEPENDENT
DOING_HOUSEWORK: INDEPENDENT
MANAGING_FINANCES: INDEPENDENT
GROCERY_SHOPPING: INDEPENDENT
DRESSING: INDEPENDENT
BATHING: INDEPENDENT

## 2023-07-21 ASSESSMENT — PATIENT HEALTH QUESTIONNAIRE - PHQ9
SUM OF ALL RESPONSES TO PHQ9 QUESTIONS 1 AND 2: 0
2. FEELING DOWN, DEPRESSED OR HOPELESS: NOT AT ALL
1. LITTLE INTEREST OR PLEASURE IN DOING THINGS: NOT AT ALL

## 2023-07-21 NOTE — PROGRESS NOTES
Subjective   Reason for Visit: Luda Song is a 67 y.o. female here for a Medicare Wellness visit.   Covid vax: x 3  CRC: 12/2022  Mammogram: 8/2022-ordered  Pap: n/a  Lmp: n/a  Hba1c 5.7      CHECKLIST REVIEWED AND COMPLETE FOR AMW    Past Medical, Surgical, and Family History reviewed and updated in chart.    Reviewed all medications by prescribing practitioner or clinical pharmacist (such as prescriptions, OTCs, herbal therapies and supplements) and documented in the medical record.  Medicare Annual Wellness Visit Subsequent, Hyperglycemia, Hypertension, Hyperlipidemia, and Hypothyroidism  HPI    Patient Self Assessment of Health Status  Patient Self Assessment: Good    Nutrition and Exercise  Current Diet: HEALTHY Diet always best, minimizing excess carbs  Adequate Fluid Intake: Yes  Caffeine: -aware to minimize intake  Exercise Frequency: Regularly advised    Functional Ability/Level of Safety  Cognitive Impairment Observed: No cognitive impairment observed    Home Safety Risk Factors: None    Patient Care Team:  Shirlene Curry MD as PCP - General    HPI  Patient Active Problem List   Diagnosis    Allergic rhinitis    Chronic cough    Dyslipidemia    Essential hypertension    Hyperglycemia    Other specified hypothyroidism    Adenomatous polyp of ascending colon    Osteopenia      Past Surgical History:   Procedure Laterality Date    CARPAL TUNNEL RELEASE Bilateral 1990    COLONOSCOPY W/ POLYPECTOMY  12/2022    adenoma    TRIGGER FINGER RELEASE Bilateral 1995       Review of Systems  This patient has   NO history of recent Covid nor flu symptoms,  NO Fever nor chills,  NO Chest pain, shortness of breath nor paroxysmal nocturnal dyspnea,  NO Nausea, vomiting, nor diarrhea,  NO Hematochezia nor melena,  NO Dysuria, hematuria, nor new incontinence issues  NO new severe headaches nor neurological complaints,  NO new issues with anxiety nor depression nor new psychiatric complaints,  NO suicidal nor  "homicidal ideations.     OBJECTIVE:  /64   Pulse 58   Temp 36.1 °C (97 °F) (Temporal)   Resp 16   Ht 1.549 m (5' 1\")   Wt 65.3 kg (144 lb)   LMP  (LMP Unknown)   SpO2 98%   BMI 27.21 kg/m²      General:  alert, oriented, no acute distress.  No obvious skin rashes noted.   No gait disturbance noted.    Mood is pleasant, not tearful, no signs of emotional distress.  Not appearing intoxicated or altered.   No voiced delusions,   Normal, appropriate behavior.    HEENT: Normocephalic, atraumatic,   Pupils round, reactive to light  Extraocular motions intact and wnl  Tympanic membranes normal    Neck: no nuchal rigidity  No masses palpable.  No carotid bruits.  No thyromegaly.    Respiratory: Equal breath sounds  No wheezes,    rales,    nor rhonchi  No respiratory distress.    Heart: Regular rate and rhythm, no    murmurs  no rubs/gallops    Abdomen: no masses palpable, no rebound nor guarding, no rebound nor guarding.    Extremities: NO cyanosis noted, no clubbing.   No edema noted.  2+dorsalis pedis pulses.    Normal-not antalgic, steady gait.  Slight sunburn on RLE    Lab on 07/17/2023   Component Date Value Ref Range Status    Glucose 07/17/2023 102 (H)  74 - 99 mg/dL Final    Sodium 07/17/2023 141  136 - 145 mmol/L Final    Potassium 07/17/2023 5.1  3.5 - 5.3 mmol/L Final    Chloride 07/17/2023 106  98 - 107 mmol/L Final    Bicarbonate 07/17/2023 29  21 - 32 mmol/L Final    Anion Gap 07/17/2023 11  10 - 20 mmol/L Final    Urea Nitrogen 07/17/2023 17  6 - 23 mg/dL Final    Creatinine 07/17/2023 0.90  0.50 - 1.05 mg/dL Final    GFR Female 07/17/2023 69  >90 mL/min/1.73m2 Final     CALCULATIONS OF ESTIMATED GFR ARE PERFORMED   USING THE 2021 CKD-EPI STUDY REFIT EQUATION   WITHOUT THE RACE VARIABLE FOR THE IDMS-TRACEABLE   CREATININE METHODS.    https://jasn.asnjournals.org/content/early/2021/09/22/ASN.2324983501    Calcium 07/17/2023 9.2  8.6 - 10.3 mg/dL Final    Albumin 07/17/2023 4.2  3.4 - 5.0 g/dL " Final    Alkaline Phosphatase 07/17/2023 85  33 - 136 U/L Final    Total Protein 07/17/2023 6.7  6.4 - 8.2 g/dL Final    AST 07/17/2023 23  9 - 39 U/L Final    Total Bilirubin 07/17/2023 0.7  0.0 - 1.2 mg/dL Final    ALT (SGPT) 07/17/2023 21  7 - 45 U/L Final     Patients treated with Sulfasalazine may generate    falsely decreased results for ALT.    Cholesterol 07/17/2023 139  0 - 199 mg/dL Final    .      AGE      DESIRABLE   BORDERLINE HIGH   HIGH     0-19 Y     0 - 169       170 - 199     >/= 200    20-24 Y     0 - 189       190 - 224     >/= 225         >24 Y     0 - 199       200 - 239     >/= 240   **All ranges are based on fasting samples. Specific   therapeutic targets will vary based on patient-specific   cardiac risk.  .   Pediatric guidelines reference:Pediatrics 2011, 128(S5).   Adult guidelines reference: NCEP ATPIII Guidelines,     STAN 2001, 258:2486-97  .   Venipuncture immediately after or during the    administration of Metamizole may lead to falsely   low results. Testing should be performed immediately   prior to Metamizole dosing.    HDL 07/17/2023 41.7  mg/dL Final    .      AGE      VERY LOW   LOW     NORMAL    HIGH       0-19 Y       < 35   < 40     40-45     ----    20-24 Y       ----   < 40       >45     ----      >24 Y       ----   < 40     40-60      >60  .    Cholesterol/HDL Ratio 07/17/2023 3.3   Final    REF VALUES  DESIRABLE  < 3.4  HIGH RISK  > 5.0    LDL 07/17/2023 58  0 - 99 mg/dL Final    .                           NEAR      BORD      AGE      DESIRABLE  OPTIMAL    HIGH     HIGH     VERY HIGH     0-19 Y     0 - 109     ---    110-129   >/= 130     ----    20-24 Y     0 - 119     ---    120-159   >/= 160     ----      >24 Y     0 -  99   100-129  130-159   160-189     >/=190  .    VLDL 07/17/2023 39  0 - 40 mg/dL Final    Triglycerides 07/17/2023 195 (H)  0 - 149 mg/dL Final    .      AGE      DESIRABLE   BORDERLINE HIGH   HIGH     VERY HIGH   0 D-90 D    19 - 174         ----          ----        ----  91 D- 9 Y     0 -  74        75 -  99     >/= 100      ----    10-19 Y     0 -  89        90 - 129     >/= 130      ----    20-24 Y     0 - 114       115 - 149     >/= 150      ----         >24 Y     0 - 149       150 - 199    200- 499    >/= 500  .   Venipuncture immediately after or during the    administration of Metamizole may lead to falsely   low results. Testing should be performed immediately   prior to Metamizole dosing.    WBC 07/17/2023 6.8  4.4 - 11.3 x10E9/L Final    RBC 07/17/2023 4.45  4.00 - 5.20 x10E12/L Final    Hemoglobin 07/17/2023 13.3  12.0 - 16.0 g/dL Final    Hematocrit 07/17/2023 41.7  36.0 - 46.0 % Final    MCV 07/17/2023 94  80 - 100 fL Final    MCHC 07/17/2023 31.9 (L)  32.0 - 36.0 g/dL Final    Platelets 07/17/2023 278  150 - 450 x10E9/L Final    RDW 07/17/2023 13.0  11.5 - 14.5 % Final    TSH 07/17/2023 1.34  0.44 - 3.98 mIU/L Final     TSH testing is performed using different testing    methodology at [facility] than at other    [facility]. Direct result comparisons should    only be made within the same method.    Free T4 07/17/2023 1.03  0.61 - 1.12 ng/dL Final     Thyroxine Free testing is performed using different testing    methodology at [facility] than at other    [facility]. Direct result comparisons should    only be made within the same method.  .   Biotin can cause falsely elevated free T4 results. Patients   taking a Biotin dose of up to 10 mg/day should refrain from   taking Biotin for 24 hours before sample collection. Patient   taking a Biotin dose of >10 mg/day should consult with their   physician or the laboratory before the blood draw.    Hemoglobin A1C 07/17/2023 5.7 (A)  % Final         Diagnosis of Diabetes-Adults   Non-Diabetic: < or = 5.6%   Increased risk for developing diabetes: 5.7-6.4%   Diagnostic of diabetes: > or = 6.5%  .       Monitoring of Diabetes                Age (y)     Therapeutic  Goal (%)   Adults:          >18           <7.0   Pediatrics:    13-18           <7.5                   7-12           <8.0                   0- 6            7.5-8.5   American Diabetes Association. Diabetes Care 33(S1), Jan 2010.    Estimated Average Glucose 07/17/2023 117  MG/DL Final        Assessment/Plan     Problem List Items Addressed This Visit       Dyslipidemia    Essential hypertension    Hyperglycemia    Other specified hypothyroidism    Osteopenia     Other Visit Diagnoses       Routine general medical examination at health care facility    -  Primary    Encounter for Medicare annual wellness exam        Encounter for screening mammogram for malignant neoplasm of breast        Relevant Orders    BI mammo bilateral screening tomosynthesis          Advance Care Planning Note   Discussion Date: 07/21/23   Discussion Participants: patient    The patient wishes to discuss Advance Care Planning today and the following is a brief summary of our discussion.     Patient has capacity to make their own medical decisions: Yes  Health Care Agent/Surrogate Decision Maker documented in chart: Yes  Documents on file and valid:  Advance Directive/Living Will: Yes   Health Care Power of : Yes  Communication of Medical Status/Prognosis:   yes   Communication of Treatment Goals/Options:   yes  Treatment Decisions  yes  Time Statement: Total face to face time spent on advance care planning was <30 minutes with <30 minutes spent in counseling, including the explanation.    SEE ME AT NEXT REGULARLY SCHEDULED VISIT-sooner if condition deteriorates or new problems arise.  Full code desired  Remote gyn surgery cannot remember what was done  No depression  No dementia  Hba1c 6mo  Low carb diet

## 2023-07-21 NOTE — PROGRESS NOTES
"Subjective   Reason for Visit: Luda Song is an 67 y.o. female here for a Medicare Wellness visit.     Past Medical, Surgical, and Family History reviewed and updated in chart.    Reviewed all medications by prescribing practitioner or clinical pharmacist (such as prescriptions, OTCs, herbal therapies and supplements) and documented in the medical record.    HPI    Patient Care Team:  Shirlene Curry MD as PCP - General  Shirlene Curry MD as PCP - [employer] PCP     Review of Systems    Objective   Vitals:  /64   Pulse 58   Temp 36.1 °C (97 °F) (Temporal)   Resp 16   Ht 1.549 m (5' 1\")   Wt 65.3 kg (144 lb)   LMP  (LMP Unknown)   SpO2 98%   BMI 27.21 kg/m²       Physical Exam    Assessment/Plan   Problem List Items Addressed This Visit     Dyslipidemia    Essential hypertension    Hyperglycemia    Other specified hypothyroidism    Osteopenia   Other Visit Diagnoses     Routine general medical examination at health care facility    -  Primary    Encounter for Medicare annual wellness exam        Encounter for screening mammogram for malignant neoplasm of breast                 "

## 2023-08-17 ENCOUNTER — HOSPITAL ENCOUNTER (OUTPATIENT)
Dept: WOMENS IMAGING | Age: 68
Discharge: HOME | End: 2023-08-19
Payer: MEDICARE

## 2023-08-17 DIAGNOSIS — Z12.31 ENCOUNTER FOR SCREENING MAMMOGRAM FOR MALIGNANT NEOPLASM OF BREAST: ICD-10-CM

## 2023-08-17 PROCEDURE — 77067 SCR MAMMO BI INCL CAD: CPT

## 2023-09-05 DIAGNOSIS — E03.8 OTHER SPECIFIED HYPOTHYROIDISM: ICD-10-CM

## 2023-09-05 DIAGNOSIS — E78.5 DYSLIPIDEMIA: ICD-10-CM

## 2023-09-05 DIAGNOSIS — I10 ESSENTIAL HYPERTENSION: ICD-10-CM

## 2023-09-06 RX ORDER — ATORVASTATIN CALCIUM 10 MG/1
10 TABLET, FILM COATED ORAL NIGHTLY
Qty: 90 TABLET | Refills: 3 | Status: SHIPPED | OUTPATIENT
Start: 2023-09-06

## 2023-09-06 RX ORDER — LEVOTHYROXINE SODIUM 88 UG/1
88 TABLET ORAL DAILY
Qty: 90 TABLET | Refills: 3 | Status: SHIPPED | OUTPATIENT
Start: 2023-09-06

## 2023-09-06 RX ORDER — METOPROLOL SUCCINATE 100 MG/1
100 TABLET, EXTENDED RELEASE ORAL DAILY
Qty: 90 TABLET | Refills: 3 | Status: SHIPPED | OUTPATIENT
Start: 2023-09-06

## 2023-09-06 RX ORDER — SPIRONOLACTONE 25 MG/1
25 TABLET ORAL DAILY
Qty: 90 TABLET | Refills: 3 | Status: SHIPPED | OUTPATIENT
Start: 2023-09-06

## 2023-11-27 ENCOUNTER — TELEPHONE (OUTPATIENT)
Dept: PRIMARY CARE | Facility: CLINIC | Age: 68
End: 2023-11-27
Payer: MEDICARE

## 2023-11-27 NOTE — TELEPHONE ENCOUNTER
patient in need of Ambien 5 mg cant remember last time she was given this med as it is not on her med list .... DM 57

## 2024-01-15 ENCOUNTER — CLINICAL SUPPORT (OUTPATIENT)
Dept: PRIMARY CARE | Facility: CLINIC | Age: 69
End: 2024-01-15
Payer: MEDICARE

## 2024-01-15 DIAGNOSIS — R73.9 HYPERGLYCEMIA: Primary | ICD-10-CM

## 2024-01-15 LAB — POC HEMOGLOBIN A1C: 5.8 % (ref 4.2–6.5)

## 2024-01-15 PROCEDURE — 83036 HEMOGLOBIN GLYCOSYLATED A1C: CPT | Performed by: FAMILY MEDICINE

## 2024-01-15 NOTE — PROGRESS NOTES
Pt here today for in office A1c test. Specimen collected from right ring finger. Results = 5.8 which are not WNL. Pt informed to schedule check up with PCP.

## 2024-03-01 ENCOUNTER — APPOINTMENT (OUTPATIENT)
Dept: PRIMARY CARE | Facility: CLINIC | Age: 69
End: 2024-03-01
Payer: MEDICARE

## 2024-03-15 ENCOUNTER — OFFICE VISIT (OUTPATIENT)
Dept: PRIMARY CARE | Facility: CLINIC | Age: 69
End: 2024-03-15
Payer: MEDICARE

## 2024-03-15 VITALS
RESPIRATION RATE: 16 BRPM | OXYGEN SATURATION: 98 % | DIASTOLIC BLOOD PRESSURE: 76 MMHG | BODY MASS INDEX: 27.94 KG/M2 | SYSTOLIC BLOOD PRESSURE: 124 MMHG | HEART RATE: 76 BPM | HEIGHT: 61 IN | TEMPERATURE: 97.5 F | WEIGHT: 148 LBS

## 2024-03-15 DIAGNOSIS — E03.8 OTHER SPECIFIED HYPOTHYROIDISM: ICD-10-CM

## 2024-03-15 DIAGNOSIS — Z00.00 ENCOUNTER FOR MEDICARE ANNUAL WELLNESS EXAM: ICD-10-CM

## 2024-03-15 DIAGNOSIS — Z71.89 ADVANCE CARE PLANNING: ICD-10-CM

## 2024-03-15 DIAGNOSIS — Z00.00 ROUTINE GENERAL MEDICAL EXAMINATION AT HEALTH CARE FACILITY: Primary | ICD-10-CM

## 2024-03-15 DIAGNOSIS — Z71.89 EARLY INTERVENTION COUNSELING: ICD-10-CM

## 2024-03-15 DIAGNOSIS — Z13.89 MULTIPHASIC SCREENING: ICD-10-CM

## 2024-03-15 DIAGNOSIS — R73.9 HYPERGLYCEMIA: ICD-10-CM

## 2024-03-15 DIAGNOSIS — E78.5 DYSLIPIDEMIA: ICD-10-CM

## 2024-03-15 DIAGNOSIS — I10 ESSENTIAL HYPERTENSION: ICD-10-CM

## 2024-03-15 PROCEDURE — G0439 PPPS, SUBSEQ VISIT: HCPCS | Performed by: FAMILY MEDICINE

## 2024-03-15 PROCEDURE — 1170F FXNL STATUS ASSESSED: CPT | Performed by: FAMILY MEDICINE

## 2024-03-15 PROCEDURE — 1160F RVW MEDS BY RX/DR IN RCRD: CPT | Performed by: FAMILY MEDICINE

## 2024-03-15 PROCEDURE — 1036F TOBACCO NON-USER: CPT | Performed by: FAMILY MEDICINE

## 2024-03-15 PROCEDURE — 3074F SYST BP LT 130 MM HG: CPT | Performed by: FAMILY MEDICINE

## 2024-03-15 PROCEDURE — 1159F MED LIST DOCD IN RCRD: CPT | Performed by: FAMILY MEDICINE

## 2024-03-15 PROCEDURE — 3078F DIAST BP <80 MM HG: CPT | Performed by: FAMILY MEDICINE

## 2024-03-15 PROCEDURE — 99497 ADVNCD CARE PLAN 30 MIN: CPT | Performed by: FAMILY MEDICINE

## 2024-03-15 PROCEDURE — G0446 INTENS BEHAVE THER CARDIO DX: HCPCS | Performed by: FAMILY MEDICINE

## 2024-03-15 PROCEDURE — 99397 PER PM REEVAL EST PAT 65+ YR: CPT | Performed by: FAMILY MEDICINE

## 2024-03-15 PROCEDURE — G0444 DEPRESSION SCREEN ANNUAL: HCPCS | Performed by: FAMILY MEDICINE

## 2024-03-15 ASSESSMENT — ENCOUNTER SYMPTOMS
DEPRESSION: 0
LOSS OF SENSATION IN FEET: 0
OCCASIONAL FEELINGS OF UNSTEADINESS: 0

## 2024-03-15 ASSESSMENT — ACTIVITIES OF DAILY LIVING (ADL)
MANAGING_FINANCES: INDEPENDENT
TAKING_MEDICATION: INDEPENDENT
DOING_HOUSEWORK: INDEPENDENT
GROCERY_SHOPPING: INDEPENDENT
BATHING: INDEPENDENT
DRESSING: INDEPENDENT

## 2024-03-15 ASSESSMENT — PATIENT HEALTH QUESTIONNAIRE - PHQ9
1. LITTLE INTEREST OR PLEASURE IN DOING THINGS: NOT AT ALL
2. FEELING DOWN, DEPRESSED OR HOPELESS: NOT AT ALL
SUM OF ALL RESPONSES TO PHQ9 QUESTIONS 1 AND 2: 0

## 2024-03-15 NOTE — PROGRESS NOTES
Covid vax: x 4  Flu: UTD per pt  Tdap: advised  Pneumo: UTD  RSV: advised  Shingles: UTD    CRC: 12/2022  Mammogram: 8/2023  Pap: 7/2021  Lmp: n/a

## 2024-03-15 NOTE — PATIENT INSTRUCTIONS
RSV and Tetanus (Tdap) vaccines are recommended. Medicare prefers you get these done at a pharmacy.  4mo hba1c in office and apt

## 2024-03-15 NOTE — PROGRESS NOTES
Subjective   Reason for Visit: Luda Song is a 68 y.o. female here for a Medicare Wellness visit.   Covid vax: x 4  Flu: UTD per pt  Tdap: advised  Pneumo: UTD  RSV: advised  Shingles: UTD   ascvd 10.!%  Reducing risk addressed  Hba1c 6.1  Ldl 66    CRC: 12/2022  Mammogram: 8/2023  Pap: 7/2021  Lmp: n/a  CHECKLIST REVIEWED AND COMPLETE FOR [facility]    Past Medical, Surgical, and Family History reviewed and updated in chart.    Reviewed all medications by prescribing practitioner or clinical pharmacist (such as prescriptions, OTCs, herbal therapies and supplements) and documented in the medical record.  Medicare Annual Wellness Visit Subsequent, Prediabetes, Hypertension, and Hyperlipidemia  HPI    Patient Self Assessment of Health Status  Patient Self Assessment: Good    Nutrition and Exercise  Current Diet: HEALTHY Diet always best, minimizing excess carbs  Adequate Fluid Intake: Yes  Caffeine: -aware to minimize intake  Exercise Frequency: Regularly advised    Functional Ability/Level of Safety  Cognitive Impairment Observed: No cognitive impairment observed    Home Safety Risk Factors: None    Patient Care Team:  Shirlene Curry MD as PCP - General    HPI  Patient Active Problem List   Diagnosis    Allergic rhinitis    Chronic cough    Dyslipidemia    Essential hypertension    Hyperglycemia    Other specified hypothyroidism    Adenomatous polyp of ascending colon    Osteopenia      Past Surgical History:   Procedure Laterality Date    CARPAL TUNNEL RELEASE Bilateral 1990    COLONOSCOPY W/ POLYPECTOMY  12/2022    adenoma    TRIGGER FINGER RELEASE Bilateral 1995       Review of Systems no sz mi or cva    This patient has   NO history of recent Covid nor flu symptoms,  NO Fever nor chills,  NO Chest pain, shortness of breath nor paroxysmal nocturnal dyspnea,  NO Nausea, vomiting, nor diarrhea,  NO Hematochezia nor melena,  NO Dysuria, hematuria, nor new incontinence issues  NO new severe headaches nor  "neurological complaints,  NO new issues with anxiety nor depression nor new psychiatric complaints,  NO suicidal nor homicidal ideations.     OBJECTIVE:  /76   Pulse 76   Temp 36.4 °C (97.5 °F) (Temporal)   Resp 16   Ht 1.549 m (5' 1\")   Wt 67.1 kg (148 lb)   LMP  (LMP Unknown)   SpO2 98%   BMI 27.96 kg/m²      General:  alert, oriented, no acute distress.  No obvious skin rashes noted.   No gait disturbance noted.    Mood is pleasant, not tearful, no signs of emotional distress.  Not appearing intoxicated or altered.   No voiced delusions,   Normal, appropriate behavior.    HEENT: Normocephalic, atraumatic,   Pupils round, reactive to light  Extraocular motions intact and wnl  Tympanic membranes normal    Neck: no nuchal rigidity  No masses palpable.  No carotid bruits.  No thyromegaly.    Respiratory: Equal breath sounds  No wheezes,    rales,    nor rhonchi  No respiratory distress.    Heart: Regular rate and rhythm, no    murmurs  no rubs/gallops    Abdomen: no masses palpable, no rebound nor guarding, no rebound nor guarding.    Extremities: NO cyanosis noted, no clubbing.   No edema noted.  2+dorsalis pedis pulses.    Normal-not antalgic, steady gait.  A lot of sks frozen on back yesterday -healing no cellulitis  Many lesion >15-20    Clinical Support on 01/15/2024   Component Date Value Ref Range Status    POC HEMOGLOBIN A1c 01/15/2024 5.8  4.2 - 6.5 % Final        Assessment/Plan     Problem List Items Addressed This Visit       Dyslipidemia    Essential hypertension    Hyperglycemia    Other specified hypothyroidism     Other Visit Diagnoses       Encounter for Medicare annual wellness exam              Advance Care Planning Note   Discussion Date: 03/15/24   Discussion Participants: patient    The patient wishes to discuss Advance Care Planning today and the following is a brief summary of our discussion.     Patient has capacity to make their own medical decisions: Yes  Health Care " Agent/Surrogate Decision Maker documented in chart: Yes  Documents on file and valid:  Advance Directive/Living Will: Yes   Health Care Power of : Yes  Communication of Medical Status/Prognosis:   yes   Communication of Treatment Goals/Options:   yes  Treatment Decisions  yes  Time Statement: Total face to face time spent on advance care planning was <30 minutes with <30 minutes spent in counseling, including the explanation.    SEE ME AT NEXT REGULARLY SCHEDULED VISIT-sooner if condition deteriorates or new problems arise.  Hba1c discussed at length  Adamantly declines med at this time  4mo hba1c  Low carb diet  Tried to discuss metformin-again -declined ama  Sees dr dover

## 2024-07-15 ENCOUNTER — APPOINTMENT (OUTPATIENT)
Dept: PRIMARY CARE | Facility: CLINIC | Age: 69
End: 2024-07-15
Payer: MEDICARE

## 2024-07-15 VITALS
WEIGHT: 147 LBS | OXYGEN SATURATION: 98 % | HEART RATE: 62 BPM | BODY MASS INDEX: 27.75 KG/M2 | SYSTOLIC BLOOD PRESSURE: 138 MMHG | DIASTOLIC BLOOD PRESSURE: 70 MMHG | HEIGHT: 61 IN | TEMPERATURE: 97 F | RESPIRATION RATE: 16 BRPM

## 2024-07-15 DIAGNOSIS — E78.5 DYSLIPIDEMIA: ICD-10-CM

## 2024-07-15 DIAGNOSIS — Z12.31 ENCOUNTER FOR SCREENING MAMMOGRAM FOR MALIGNANT NEOPLASM OF BREAST: ICD-10-CM

## 2024-07-15 DIAGNOSIS — R73.9 HYPERGLYCEMIA: ICD-10-CM

## 2024-07-15 DIAGNOSIS — I10 ESSENTIAL HYPERTENSION: ICD-10-CM

## 2024-07-15 DIAGNOSIS — E03.8 OTHER SPECIFIED HYPOTHYROIDISM: ICD-10-CM

## 2024-07-15 LAB — POC HEMOGLOBIN A1C: 5.7 % (ref 4.2–6.5)

## 2024-07-15 PROCEDURE — 3078F DIAST BP <80 MM HG: CPT | Performed by: FAMILY MEDICINE

## 2024-07-15 PROCEDURE — 1159F MED LIST DOCD IN RCRD: CPT | Performed by: FAMILY MEDICINE

## 2024-07-15 PROCEDURE — 99214 OFFICE O/P EST MOD 30 MIN: CPT | Performed by: FAMILY MEDICINE

## 2024-07-15 PROCEDURE — 1160F RVW MEDS BY RX/DR IN RCRD: CPT | Performed by: FAMILY MEDICINE

## 2024-07-15 PROCEDURE — 1036F TOBACCO NON-USER: CPT | Performed by: FAMILY MEDICINE

## 2024-07-15 PROCEDURE — 83036 HEMOGLOBIN GLYCOSYLATED A1C: CPT | Performed by: FAMILY MEDICINE

## 2024-07-15 PROCEDURE — 3075F SYST BP GE 130 - 139MM HG: CPT | Performed by: FAMILY MEDICINE

## 2024-07-15 NOTE — PROGRESS NOTES
Covid vax: x 3  Flu: n/a  Pneumo: UTD  RSV: advised  Shingles: UTD    CRC: 12/2022  Mammogram: 8/2023-ordered  Pap: 7/2021  Lmp: n/a

## 2024-07-15 NOTE — PROGRESS NOTES
"Subjective   Patient ID: Luda Song is a 68 y.o. female who presents for Hyperglycemia (Recheck A1C).  Covid vax: x 3  Flu: n/a  Pneumo: UTD  RSV: advised  Shingles: UTD     CRC: 12/2022  Mammogram: 8/2023-ordered  Pap: 7/2021  Lmp: n/a  Hba1c 5.7    HPI  Patient Active Problem List   Diagnosis    Allergic rhinitis    Chronic cough    Dyslipidemia    Essential hypertension    Hyperglycemia    Other specified hypothyroidism    Adenomatous polyp of ascending colon    Osteopenia       Past Surgical History:   Procedure Laterality Date    CARPAL TUNNEL RELEASE Bilateral 1990    COLONOSCOPY W/ POLYPECTOMY  12/2022    adenoma    TRIGGER FINGER RELEASE Bilateral 1995       Review of Systems  This patient has  NO history of seizures/ CAD or CVA    NO history of recent Covid nor flu symptoms,  NO Fever nor chills,  NO Chest pain, shortness of breath nor paroxysmal nocturnal dyspnea,  NO Nausea, vomiting, nor diarrhea,  NO Hematochezia nor melena,  NO Dysuria, hematuria, nor new incontinence issues  NO new severe headaches nor neurological complaints,  NO new issues with anxiety nor depression nor new psychiatric complaints,  NO suicidal nor homicidal ideations.     OBJECTIVE:  /70   Pulse 62   Temp 36.1 °C (97 °F) (Temporal)   Resp 16   Ht 1.549 m (5' 1\")   Wt 66.7 kg (147 lb)   LMP  (LMP Unknown)   SpO2 98%   BMI 27.78 kg/m²      General:  alert, oriented, no acute distress.  No obvious skin rashes noted.   No gait disturbance noted.    Mood is pleasant,  no signs of emotional distress.   Not appearing intoxicated or altered.   No voiced delusions,   Normal, appropriate behavior.    HEENT: Normocephalic, atraumatic,   Pupils round, reactive to light  Extraocular motions intact and wnl  Tympanic membranes normal    Neck: no nuchal rigidity  No masses palpable.  No carotid bruits.  No thyromegaly.    Respiratory: Equal breath sounds  No wheezes,    rales,    nor rhonchi  No respiratory distress.    Heart: " Regular rate and rhythm, no    murmurs  no rubs/gallops    Abdomen: no masses palpable, nontender, no rebound nor guarding.    Extremities: NO cyanosis noted, no clubbing.   No edema noted.  2+dorsalis pedis pulses.    Normal-not antalgic, steady gait.    Office Visit on 07/15/2024   Component Date Value Ref Range Status    POC HEMOGLOBIN A1c 07/15/2024 5.7  4.2 - 6.5 % Final        Assessment/Plan     Problem List Items Addressed This Visit       Dyslipidemia    Relevant Orders    Comprehensive Metabolic Panel    CBC and Auto Differential    Hemoglobin A1C    Lipid Panel    Thyroid Stimulating Hormone    Thyroxine, Free    Essential hypertension    Relevant Orders    Comprehensive Metabolic Panel    CBC and Auto Differential    Hemoglobin A1C    Lipid Panel    Thyroid Stimulating Hormone    Thyroxine, Free    Hyperglycemia    Relevant Orders    POCT glycosylated hemoglobin (Hb A1C) manually resulted (Completed)    Comprehensive Metabolic Panel    CBC and Auto Differential    Hemoglobin A1C    Lipid Panel    Thyroid Stimulating Hormone    Thyroxine, Free    Other specified hypothyroidism    Relevant Orders    Comprehensive Metabolic Panel    CBC and Auto Differential    Hemoglobin A1C    Lipid Panel    Thyroid Stimulating Hormone    Thyroxine, Free     Other Visit Diagnoses       Encounter for screening mammogram for malignant neoplasm of breast        Relevant Orders    BI mammo bilateral screening tomosynthesis    Comprehensive Metabolic Panel    CBC and Auto Differential    Hemoglobin A1C    Lipid Panel    Thyroid Stimulating Hormone    Thyroxine, Free            Follow up at next scheduled visit -as planned    Low carb diet  Labs in 6mo  Appt then  Calcium and vit d discussed  Mamm due  The patient is aware that results will be forthcoming of ALL planned labs and or tests. If no results are received on my chart or by letter within 1 - 3 weeks, the patient is aware they need to call to obtain results, as this is  not usual. Also, if any new conditions arise, or current condition worsens, it is understood that sooner appointment should be made or urgent care/convenient care or emergency room treatment should be sought depending on severity. Otherwise follow up for recheck at regular intervals as we have discussed, at least yearly.    Sees derm-blue light tx for pre-ca

## 2024-07-27 DIAGNOSIS — E03.8 OTHER SPECIFIED HYPOTHYROIDISM: ICD-10-CM

## 2024-07-27 DIAGNOSIS — E78.5 DYSLIPIDEMIA: ICD-10-CM

## 2024-07-27 DIAGNOSIS — I10 ESSENTIAL HYPERTENSION: ICD-10-CM

## 2024-07-29 RX ORDER — ATORVASTATIN CALCIUM 10 MG/1
10 TABLET, FILM COATED ORAL NIGHTLY
Qty: 90 TABLET | Refills: 1 | Status: SHIPPED | OUTPATIENT
Start: 2024-07-29

## 2024-07-29 RX ORDER — LEVOTHYROXINE SODIUM 88 UG/1
88 TABLET ORAL DAILY
Qty: 90 TABLET | Refills: 1 | Status: SHIPPED | OUTPATIENT
Start: 2024-07-29

## 2024-07-29 RX ORDER — SPIRONOLACTONE 25 MG/1
25 TABLET ORAL DAILY
Qty: 90 TABLET | Refills: 1 | Status: SHIPPED | OUTPATIENT
Start: 2024-07-29

## 2024-07-29 RX ORDER — METOPROLOL SUCCINATE 100 MG/1
100 TABLET, EXTENDED RELEASE ORAL DAILY
Qty: 90 TABLET | Refills: 1 | Status: SHIPPED | OUTPATIENT
Start: 2024-07-29

## 2024-08-19 ENCOUNTER — HOSPITAL ENCOUNTER (OUTPATIENT)
Dept: RADIOLOGY | Facility: HOSPITAL | Age: 69
Discharge: HOME | End: 2024-08-19
Payer: MEDICARE

## 2024-08-19 VITALS — WEIGHT: 139 LBS | BODY MASS INDEX: 26.26 KG/M2

## 2024-08-19 DIAGNOSIS — Z12.31 ENCOUNTER FOR SCREENING MAMMOGRAM FOR MALIGNANT NEOPLASM OF BREAST: ICD-10-CM

## 2024-08-19 PROCEDURE — 77067 SCR MAMMO BI INCL CAD: CPT

## 2024-08-26 ENCOUNTER — HOSPITAL ENCOUNTER (OUTPATIENT)
Dept: RADIOLOGY | Facility: EXTERNAL LOCATION | Age: 69
Discharge: HOME | End: 2024-08-26
Payer: MEDICARE

## 2024-12-16 ENCOUNTER — APPOINTMENT (OUTPATIENT)
Dept: PRIMARY CARE | Facility: CLINIC | Age: 69
End: 2024-12-16
Payer: MEDICARE

## 2024-12-16 VITALS
SYSTOLIC BLOOD PRESSURE: 132 MMHG | WEIGHT: 149 LBS | BODY MASS INDEX: 28.13 KG/M2 | OXYGEN SATURATION: 97 % | HEIGHT: 61 IN | RESPIRATION RATE: 16 BRPM | DIASTOLIC BLOOD PRESSURE: 64 MMHG | TEMPERATURE: 97.2 F | HEART RATE: 62 BPM

## 2024-12-16 DIAGNOSIS — E03.8 OTHER SPECIFIED HYPOTHYROIDISM: ICD-10-CM

## 2024-12-16 DIAGNOSIS — E78.5 DYSLIPIDEMIA: ICD-10-CM

## 2024-12-16 DIAGNOSIS — Z23 NEED FOR VACCINATION: ICD-10-CM

## 2024-12-16 DIAGNOSIS — I10 ESSENTIAL HYPERTENSION: ICD-10-CM

## 2024-12-16 DIAGNOSIS — R73.9 HYPERGLYCEMIA: ICD-10-CM

## 2024-12-16 DIAGNOSIS — R73.03 PREDIABETES: Primary | ICD-10-CM

## 2024-12-16 LAB — POC HEMOGLOBIN A1C: 5.9 % (ref 4.2–6.5)

## 2024-12-16 PROCEDURE — G0008 ADMIN INFLUENZA VIRUS VAC: HCPCS | Performed by: FAMILY MEDICINE

## 2024-12-16 PROCEDURE — 99214 OFFICE O/P EST MOD 30 MIN: CPT | Performed by: FAMILY MEDICINE

## 2024-12-16 PROCEDURE — 3008F BODY MASS INDEX DOCD: CPT | Performed by: FAMILY MEDICINE

## 2024-12-16 PROCEDURE — 1036F TOBACCO NON-USER: CPT | Performed by: FAMILY MEDICINE

## 2024-12-16 PROCEDURE — 1159F MED LIST DOCD IN RCRD: CPT | Performed by: FAMILY MEDICINE

## 2024-12-16 PROCEDURE — 1160F RVW MEDS BY RX/DR IN RCRD: CPT | Performed by: FAMILY MEDICINE

## 2024-12-16 PROCEDURE — 83036 HEMOGLOBIN GLYCOSYLATED A1C: CPT | Performed by: FAMILY MEDICINE

## 2024-12-16 PROCEDURE — 3078F DIAST BP <80 MM HG: CPT | Performed by: FAMILY MEDICINE

## 2024-12-16 PROCEDURE — 90662 IIV NO PRSV INCREASED AG IM: CPT | Performed by: FAMILY MEDICINE

## 2024-12-16 PROCEDURE — 3075F SYST BP GE 130 - 139MM HG: CPT | Performed by: FAMILY MEDICINE

## 2024-12-16 NOTE — PROGRESS NOTES
"Subjective   Patient ID: Luda Song is a 69 y.o. female who presents for Hypertension, Hypothyroidism, and Hyperglycemia.  Covid vax: x 3  Flu: UTD  Pneumo: UTD  RSV: advised  Shingles: UTD     CRC: 12/2022  Mammogram: 8/2024  Pap: 7/2021  Lmp: n/a  HPI  Patient Active Problem List   Diagnosis    Allergic rhinitis    Chronic cough    Dyslipidemia    Essential hypertension    Hyperglycemia    Other specified hypothyroidism    Adenomatous polyp of ascending colon    Osteopenia       Past Surgical History:   Procedure Laterality Date    CARPAL TUNNEL RELEASE Bilateral 1990    COLONOSCOPY W/ POLYPECTOMY  12/2022    adenoma    TRIGGER FINGER RELEASE Bilateral 1995       Review of Systems  This patient has  NO history of seizures/ CAD or CVA    NO history of recent Covid nor flu symptoms,  NO Fever nor chills,  NO Chest pain, shortness of breath nor paroxysmal nocturnal dyspnea,  NO Nausea, vomiting, nor diarrhea,  NO Hematochezia nor melena,  NO Dysuria, hematuria, nor new incontinence issues  NO new severe headaches nor neurological complaints,  NO new issues with anxiety nor depression nor new psychiatric complaints,  NO suicidal nor homicidal ideations.     OBJECTIVE:  /64   Pulse 62   Temp 36.2 °C (97.2 °F) (Temporal)   Resp 16   Ht 1.549 m (5' 1\")   Wt 67.6 kg (149 lb)   LMP  (LMP Unknown)   SpO2 97%   BMI 28.15 kg/m²      General:  alert, oriented, no acute distress.  No obvious skin rashes noted.   No gait disturbance noted.    Mood is pleasant,  no signs of emotional distress.   Not appearing intoxicated or altered.   No voiced delusions,   Normal, appropriate behavior.    HEENT: Normocephalic, atraumatic,   Pupils round, reactive to light  Extraocular motions intact and wnl  Tympanic membranes normal    Neck: no nuchal rigidity  No masses palpable.  No carotid bruits.  No thyromegaly.    Respiratory: Equal breath sounds  No wheezes,    rales,    nor rhonchi  No respiratory " distress.    Heart: Regular rate and rhythm, no    murmurs  no rubs/gallops    Abdomen: no masses palpable, nontender, no rebound nor guarding.    Extremities: NO cyanosis noted, no clubbing.   No edema noted.  2+dorsalis pedis pulses.    Normal-not antalgic, steady gait.    No visits with results within 3 Month(s) from this visit.   Latest known visit with results is:   Office Visit on 07/15/2024   Component Date Value Ref Range Status    POC HEMOGLOBIN A1c 07/15/2024 5.7  4.2 - 6.5 % Final        Assessment/Plan     Problem List Items Addressed This Visit       Dyslipidemia    Relevant Orders    Comprehensive Metabolic Panel    CBC and Auto Differential    Hemoglobin A1C    Lipid Panel    Thyroid Stimulating Hormone    Thyroxine, Free    Essential hypertension    Relevant Orders    Comprehensive Metabolic Panel    CBC and Auto Differential    Hemoglobin A1C    Lipid Panel    Thyroid Stimulating Hormone    Thyroxine, Free    Hyperglycemia    Relevant Orders    POCT glycosylated hemoglobin (Hb A1C) manually resulted    Comprehensive Metabolic Panel    CBC and Auto Differential    Hemoglobin A1C    Lipid Panel    Thyroid Stimulating Hormone    Thyroxine, Free    Other specified hypothyroidism    Relevant Orders    Comprehensive Metabolic Panel    CBC and Auto Differential    Hemoglobin A1C    Lipid Panel    Thyroid Stimulating Hormone    Thyroxine, Free     Other Visit Diagnoses       Prediabetes    -  Primary    Need for vaccination        Relevant Orders    Flu vaccine, trivalent, preservative free, HIGH-DOSE, age 65y+ (Fluzone)    Comprehensive Metabolic Panel    CBC and Auto Differential    Hemoglobin A1C    Lipid Panel    Thyroid Stimulating Hormone    Thyroxine, Free            Follow up at next scheduled visit -as planned  Low carb diet  Labs in April /may  The patient is aware that results will be forthcoming of ALL planned labs and or tests. If no results are received on my chart or by letter within 1 - 3  weeks, the patient is aware they need to call to obtain results, as this is not usual. Also, if any new conditions arise, or current condition worsens, it is understood that sooner appointment should be made or urgent care/convenient care or emergency room treatment should be sought depending on severity. Otherwise follow up for recheck at regular intervals as we have discussed, at least yearly.

## 2024-12-16 NOTE — PROGRESS NOTES
Covid vax: x 3  Flu: UTD  Pneumo: UTD  RSV: advised  Shingles: UTD    CRC: 12/2022  Mammogram: 8/2024  Pap: 7/2021  Lmp: n/a

## 2025-01-13 DIAGNOSIS — E78.5 DYSLIPIDEMIA: ICD-10-CM

## 2025-01-13 DIAGNOSIS — I10 ESSENTIAL HYPERTENSION: ICD-10-CM

## 2025-01-13 DIAGNOSIS — E03.8 OTHER SPECIFIED HYPOTHYROIDISM: ICD-10-CM

## 2025-01-13 RX ORDER — ATORVASTATIN CALCIUM 10 MG/1
10 TABLET, FILM COATED ORAL DAILY
Qty: 90 TABLET | Refills: 3 | Status: SHIPPED | OUTPATIENT
Start: 2025-01-13

## 2025-01-13 RX ORDER — SPIRONOLACTONE 25 MG/1
25 TABLET ORAL DAILY
Qty: 90 TABLET | Refills: 3 | Status: SHIPPED | OUTPATIENT
Start: 2025-01-13

## 2025-01-13 RX ORDER — LEVOTHYROXINE SODIUM 88 UG/1
88 TABLET ORAL DAILY
Qty: 90 TABLET | Refills: 3 | Status: SHIPPED | OUTPATIENT
Start: 2025-01-13

## 2025-01-13 RX ORDER — METOPROLOL SUCCINATE 100 MG/1
100 TABLET, EXTENDED RELEASE ORAL DAILY
Qty: 90 TABLET | Refills: 3 | Status: SHIPPED | OUTPATIENT
Start: 2025-01-13

## 2025-04-17 ENCOUNTER — APPOINTMENT (OUTPATIENT)
Dept: ORTHOPEDIC SURGERY | Facility: CLINIC | Age: 70
End: 2025-04-17
Payer: MEDICARE

## 2025-04-21 ENCOUNTER — OFFICE VISIT (OUTPATIENT)
Dept: PRIMARY CARE | Facility: CLINIC | Age: 70
End: 2025-04-21
Payer: MEDICARE

## 2025-04-21 VITALS
SYSTOLIC BLOOD PRESSURE: 118 MMHG | RESPIRATION RATE: 16 BRPM | OXYGEN SATURATION: 97 % | HEIGHT: 61 IN | DIASTOLIC BLOOD PRESSURE: 64 MMHG | HEART RATE: 68 BPM | BODY MASS INDEX: 29.07 KG/M2 | WEIGHT: 154 LBS | TEMPERATURE: 97.4 F

## 2025-04-21 DIAGNOSIS — Z00.00 ENCOUNTER FOR MEDICARE ANNUAL WELLNESS EXAM: Primary | ICD-10-CM

## 2025-04-21 DIAGNOSIS — E03.8 OTHER SPECIFIED HYPOTHYROIDISM: ICD-10-CM

## 2025-04-21 DIAGNOSIS — N30.00 ACUTE CYSTITIS WITHOUT HEMATURIA: ICD-10-CM

## 2025-04-21 DIAGNOSIS — R73.03 PREDIABETES: ICD-10-CM

## 2025-04-21 DIAGNOSIS — I10 ESSENTIAL HYPERTENSION: ICD-10-CM

## 2025-04-21 DIAGNOSIS — R30.0 DYSURIA: ICD-10-CM

## 2025-04-21 DIAGNOSIS — E78.5 DYSLIPIDEMIA: ICD-10-CM

## 2025-04-21 LAB
POC APPEARANCE, URINE: CLEAR
POC BILIRUBIN, URINE: NEGATIVE
POC BLOOD, URINE: ABNORMAL
POC COLOR, URINE: YELLOW
POC GLUCOSE, URINE: NEGATIVE MG/DL
POC KETONES, URINE: NEGATIVE MG/DL
POC LEUKOCYTES, URINE: NEGATIVE
POC NITRITE,URINE: NEGATIVE
POC PH, URINE: 5.5 PH
POC PROTEIN, URINE: NEGATIVE MG/DL
POC SPECIFIC GRAVITY, URINE: <=1.005
POC UROBILINOGEN, URINE: 0.2 EU/DL

## 2025-04-21 PROCEDURE — 99497 ADVNCD CARE PLAN 30 MIN: CPT | Performed by: FAMILY MEDICINE

## 2025-04-21 PROCEDURE — 81003 URINALYSIS AUTO W/O SCOPE: CPT | Performed by: FAMILY MEDICINE

## 2025-04-21 PROCEDURE — G0444 DEPRESSION SCREEN ANNUAL: HCPCS | Performed by: FAMILY MEDICINE

## 2025-04-21 PROCEDURE — G0446 INTENS BEHAVE THER CARDIO DX: HCPCS | Performed by: FAMILY MEDICINE

## 2025-04-21 PROCEDURE — 1036F TOBACCO NON-USER: CPT | Performed by: FAMILY MEDICINE

## 2025-04-21 PROCEDURE — 1160F RVW MEDS BY RX/DR IN RCRD: CPT | Performed by: FAMILY MEDICINE

## 2025-04-21 PROCEDURE — 3078F DIAST BP <80 MM HG: CPT | Performed by: FAMILY MEDICINE

## 2025-04-21 PROCEDURE — 3074F SYST BP LT 130 MM HG: CPT | Performed by: FAMILY MEDICINE

## 2025-04-21 PROCEDURE — 99397 PER PM REEVAL EST PAT 65+ YR: CPT | Performed by: FAMILY MEDICINE

## 2025-04-21 PROCEDURE — 1170F FXNL STATUS ASSESSED: CPT | Performed by: FAMILY MEDICINE

## 2025-04-21 PROCEDURE — G0439 PPPS, SUBSEQ VISIT: HCPCS | Performed by: FAMILY MEDICINE

## 2025-04-21 PROCEDURE — 3008F BODY MASS INDEX DOCD: CPT | Performed by: FAMILY MEDICINE

## 2025-04-21 PROCEDURE — 1159F MED LIST DOCD IN RCRD: CPT | Performed by: FAMILY MEDICINE

## 2025-04-21 RX ORDER — NITROFURANTOIN 25; 75 MG/1; MG/1
100 CAPSULE ORAL 2 TIMES DAILY
Qty: 14 CAPSULE | Refills: 0 | Status: SHIPPED | OUTPATIENT
Start: 2025-04-21 | End: 2025-04-28

## 2025-04-21 ASSESSMENT — ACTIVITIES OF DAILY LIVING (ADL)
DRESSING: INDEPENDENT
GROCERY_SHOPPING: INDEPENDENT
MANAGING_FINANCES: INDEPENDENT
BATHING: INDEPENDENT
DOING_HOUSEWORK: INDEPENDENT
TAKING_MEDICATION: INDEPENDENT

## 2025-04-21 ASSESSMENT — ENCOUNTER SYMPTOMS
OCCASIONAL FEELINGS OF UNSTEADINESS: 0
LOSS OF SENSATION IN FEET: 0
DEPRESSION: 0

## 2025-04-21 NOTE — PROGRESS NOTES
Covid vax: x 3  Flu: UTD  Pneumo: UTD  RSV: advised  Shingles: UTD    CRC: 12/2022  Mammogram: 8/2024  Pap: 4/2021  Lmp: n/a

## 2025-04-21 NOTE — PROGRESS NOTES
Subjective   Reason for Visit: Luda Song is a 69 y.o. female here for a Medicare Wellness visit.   Covid vax: x 3  Flu: UTD  Pneumo: UTD  RSV: advised  Shingles: UTD     CRC: 12/2022  Mammogram: 8/2024  Pap: 4/2021  Lmp: n/a  CHECKLIST REVIEWED AND COMPLETE FOR [facility] Medicare Annual Wellness Visit Subsequent and Female Dysuria  ---------------------------------------------------------------------------------------------  Past Medical, Surgical, and Family History reviewed and updated in chart.    Reviewed all medications by prescribing practitioner or clinical pharmacist (such as prescriptions, OTCs, herbal therapies and supplements) and documented in the medical record.    HPI    Patient Self Assessment of Health Status  Patient Self Assessment: Good    Nutrition and Exercise:    Current Diet: HEALTHY Diet always best, minimizing excess carbs,   weight reduction advised if BMI not WNL, please maintain a NORMAL BMI 18.5-24.9    Adequate Fluid Intake: Yes  Caffeine: -aware to minimize intake, <300mg best to even take in LESS  Exercise Frequency: Regularly advised, weight bearing, strengthening, aerobic    Functional Ability/Level of Safety:  Home safety addressed: no active new concerns,   fall risk addressed  NO new hearing issues or concerns  [place] in ADLs addressed and areas of assistance if present -noted    ANY Cognitive Impairment Observed: No cognitive impairment observed    Home Safety Risk Factors: None  --------------------------------------------------------------------------------------------  Patient Care Team:  Shirlene Curry MD as PCP - General    HPI  Problem List[1]   Surgical History[2]      PHQ2(-) No active depressed mood or not in crisis, 5min. spent in discussion.    Review of Systems:    NO Seizures  NO CAD  NO CVA    This patient has   NO history of recent Covid nor flu symptoms,  NO Fever nor chills,  NO Chest pain, shortness of breath nor paroxysmal nocturnal  "dyspnea,  NO Nausea, vomiting, nor diarrhea,  NO Hematochezia nor melena,  + Dysuria, no hematuria, nor new incontinence issues  NO new severe headaches nor neurological complaints,  NO new issues with anxiety nor depression nor new psychiatric complaints,  NO suicidal nor homicidal ideations.  ---------------------------------------------------------------------------------------------   OBJECTIVE:  /64   Pulse 68   Temp 36.3 °C (97.4 °F) (Temporal)   Resp 16   Ht 1.549 m (5' 1\")   Wt 69.9 kg (154 lb)   LMP  (LMP Unknown)   SpO2 97%   BMI 29.10 kg/m²      General:  alert, oriented, no acute distress.  No obvious skin rashes noted. Some sun damage-advised derm q yr    No gait disturbance noted.    Mood is pleasant, not tearful, no signs of emotional distress.  Not appearing intoxicated or altered.   No voiced delusions,   Normal, appropriate behavior.    HEENT: Normocephalic, atraumatic,   Pupils round, reactive to light  Extraocular motions intact and wnl  Tympanic membranes normal    Neck: no nuchal rigidity  No masses palpable.  No carotid bruits.  No thyromegaly.    Respiratory: Equal breath sounds  No wheezes,    rales,    nor rhonchi  No respiratory distress.    Heart: Regular rate and rhythm, no    murmurs  no rubs/gallops    Abdomen: no masses palpable, no rebound nor guarding, no rebound nor guarding.    Extremities: NO cyanosis noted, no clubbing.   No edema noted.  2+dorsalis pedis pulses.    Normal-not antalgic, steady gait.    Office Visit on 04/21/2025   Component Date Value Ref Range Status    POC Color, Urine 04/21/2025 Yellow  Straw, Yellow, Light-Yellow Final    POC Appearance, Urine 04/21/2025 Clear  Clear Final    POC Glucose, Urine 04/21/2025 NEGATIVE  NEGATIVE mg/dl Final    POC Bilirubin, Urine 04/21/2025 NEGATIVE  NEGATIVE Final    POC Ketones, Urine 04/21/2025 NEGATIVE  NEGATIVE mg/dl Final    POC Specific Gravity, Urine 04/21/2025 <=1.005  1.005 - 1.035 Final    POC Blood, " Urine 04/21/2025 TRACE-Intact (A)  NEGATIVE Final    POC PH, Urine 04/21/2025 5.5  No Reference Range Established PH Final    POC Protein, Urine 04/21/2025 NEGATIVE  NEGATIVE mg/dl Final    POC Urobilinogen, Urine 04/21/2025 0.2  0.2, 1.0 EU/DL Final    Poc Nitrite, Urine 04/21/2025 NEGATIVE  NEGATIVE Final    POC Leukocytes, Urine 04/21/2025 NEGATIVE  NEGATIVE Final        Assessment/Plan     Problem List Items Addressed This Visit       Dyslipidemia    Relevant Orders    CBC and Auto Differential    Comprehensive Metabolic Panel    Hemoglobin A1C    Lipid Panel    Thyroid Stimulating Hormone    Thyroxine, Free    Essential hypertension    Relevant Orders    CBC and Auto Differential    Comprehensive Metabolic Panel    Hemoglobin A1C    Lipid Panel    Thyroid Stimulating Hormone    Thyroxine, Free    Other specified hypothyroidism    Relevant Orders    CBC and Auto Differential    Comprehensive Metabolic Panel    Hemoglobin A1C    Lipid Panel    Thyroid Stimulating Hormone    Thyroxine, Free     Other Visit Diagnoses         Encounter for Medicare annual wellness exam    -  Primary    Relevant Orders    CBC and Auto Differential    Comprehensive Metabolic Panel    Hemoglobin A1C    Lipid Panel    Thyroid Stimulating Hormone    Thyroxine, Free      Acute cystitis without hematuria        Relevant Orders    CBC and Auto Differential    Comprehensive Metabolic Panel    Hemoglobin A1C    Lipid Panel    Thyroid Stimulating Hormone    Thyroxine, Free      Dysuria        Relevant Medications    nitrofurantoin, macrocrystal-monohydrate, (Macrobid) 100 mg capsule    Other Relevant Orders    POCT UA Automated manually resulted (Completed)    Urine Culture    Urinalysis with Reflex Microscopic    CBC and Auto Differential    Comprehensive Metabolic Panel    Hemoglobin A1C    Lipid Panel    Thyroid Stimulating Hormone    Thyroxine, Free      Prediabetes        Relevant Orders    Hemoglobin A1C          Advance Care Planning  Note   Discussion Date: 04/21/25   Discussion Participants: patient  16 min spent discussing ACP w pt    The patient wishes to discuss Advance Care Planning today and the following is a brief summary of our discussion.     Patient has capacity to make their own medical decisions: Yes  Health Care Agent/Surrogate Decision Maker documented in chart: Yes    Documents on file and valid:  Advance Directive/Living Will: Yes will bring  Health Care Power of : Yes   Communication of Medical Status/Prognosis:   yes   Communication of Treatment Goals/Options:   yes  Treatment Decisions/involved with patient today  yes  Time Statement: Total face to face time spent on advance care planning was <30 minutes with <30 minutes spent in counseling, including the explanation.    SEE ME AT NEXT REGULARLY SCHEDULED VISIT-sooner if condition deteriorates or new problems arise.    PATIENT WOULD LIKE TO BE A FULL CODE    NO uncontrolled DEPRESSION noted  Assessed and reviewed for opioid use.  NO EVIDENCE OF SIGNIFICANT DEMENTIA  Just as independent as last year  Sees dermatology    Signs and symptoms of concern with depression-if in crisis -(no current HI/SI) will let us know and proceed to ER   Signs/symptoms of concern with dementia-and need to contact us if they occur discussed.    ASCVD   12.7%   addressed and risk reduction conversation took place  Had ccs was 0  Will get in 2029 or so  No sxs-declines follow up  Declines metformin or med for prediabetes  Low carb diet    All above counseling 15 minutes in conversation/documentation etc    Mood counseling 5min- no uncontrolled depression, good support system, has crisis plan if occurs.  Included BMI counseling and options if BMI>30    QUESTIONS answered    Hba1c 5.8  Ldl 59, on statin      Next visit addressed -regular visit as scheduled and follow up sooner if condition deterioration or new problems arise.  Tx uti macrobid rba addressed  Ua 2wks due to blood-explained    The  risks, benefits, and alternatives for the antibiotic prescribed were discussed with patient as well as possible side effects. If ANY signs or symptoms of allergic reaction patient is to discontinue medicine immediately and call us or GO TO ER if tongue swelling/respiratory issues or significant effects.  It has already been determined that the benefits outweigh the risks of an antibiotic for you at this time, unless otherwise indicated.  Antibiotics are usually meant to be taken to completion as advised and it is usual course for symptoms to IMPROVE while taking-if symptoms worsen or new problems arise, we should be notified or medical evaluation should occur. Persistent side effects such as diarrhea especially after antibiotic discontinuation are unusual, and should prompt assessment and evaluation either in office or ER depending on severity. Rash, and/or other symptoms of concern should also cause evaluation.  Complete resolution of original symptoms are expected and we should be informed if this does not occur.    This completes Luda Song ANNUAL MEDICARE WELLNESS VISIT today.  If no other follow ups discussed: Please follow up in 1 year for NEXT ANNUAL MEDICARE WELLNESS VISIT.  Luda Song -be aware that any referrals discussed should be placed today or tests/labs ordered should result in prompt scheduling today.   If not done today-then a phone call for scheduling is expected in a timely manner(within 2 weeks).   If testing is to be done-a result should be available to patient within 2 weeks time unless otherwise specified.   You, the patient or caregiver, are responsible for making sure what was discussed is actually scheduled and completed.  If suboptimal understanding of results of tests or referral reason-a follow up appointment with me should be made.  If above does NOT occur-you are to connect with us for an explanation.    Follow up at next scheduled visit -as planned or directed  today.  Sooner if new or unresolved issues of concern.    Luda Song We know you have a choice for your health care, THANK YOU for choosing us and [facility].  We APPRECIATE YOU.  Sincerely,   Shirlene Curry MD   (dr. Stringer)      This documentation is subject to inadvertent typing and other similar clerical/grammatic errors etc.             [1]   Patient Active Problem List  Diagnosis    Allergic rhinitis    Chronic cough    Dyslipidemia    Essential hypertension    Hyperglycemia    Other specified hypothyroidism    Adenomatous polyp of ascending colon    Osteopenia   [2]   Past Surgical History:  Procedure Laterality Date    CARPAL TUNNEL RELEASE Bilateral 1990    COLONOSCOPY W/ POLYPECTOMY  12/2022    adenoma    TRIGGER FINGER RELEASE Bilateral 1995

## 2025-04-24 LAB — BACTERIA UR CULT: NORMAL

## 2025-05-01 ENCOUNTER — APPOINTMENT (OUTPATIENT)
Dept: ORTHOPEDIC SURGERY | Facility: CLINIC | Age: 70
End: 2025-05-01
Payer: MEDICARE

## 2025-05-01 DIAGNOSIS — M65.30 ACQUIRED TRIGGER FINGER: Primary | ICD-10-CM

## 2025-05-01 PROCEDURE — 1159F MED LIST DOCD IN RCRD: CPT | Performed by: STUDENT IN AN ORGANIZED HEALTH CARE EDUCATION/TRAINING PROGRAM

## 2025-05-01 PROCEDURE — 99204 OFFICE O/P NEW MOD 45 MIN: CPT | Performed by: STUDENT IN AN ORGANIZED HEALTH CARE EDUCATION/TRAINING PROGRAM

## 2025-05-01 PROCEDURE — 1036F TOBACCO NON-USER: CPT | Performed by: STUDENT IN AN ORGANIZED HEALTH CARE EDUCATION/TRAINING PROGRAM

## 2025-05-05 DIAGNOSIS — R30.0 DYSURIA: ICD-10-CM

## 2025-05-06 ENCOUNTER — TELEPHONE (OUTPATIENT)
Dept: ORTHOPEDIC SURGERY | Facility: CLINIC | Age: 70
End: 2025-05-06
Payer: MEDICARE

## 2025-05-06 ENCOUNTER — DOCUMENTATION (OUTPATIENT)
Dept: ORTHOPEDIC SURGERY | Facility: CLINIC | Age: 70
End: 2025-05-06
Payer: MEDICARE

## 2025-05-06 LAB
APPEARANCE UR: ABNORMAL
BACTERIA #/AREA URNS HPF: ABNORMAL /HPF
BILIRUB UR QL STRIP: NEGATIVE
COLOR UR: YELLOW
GLUCOSE UR QL STRIP: NEGATIVE
HGB UR QL STRIP: NEGATIVE
HYALINE CASTS #/AREA URNS LPF: ABNORMAL /LPF
KETONES UR QL STRIP: NEGATIVE
LEUKOCYTE ESTERASE UR QL STRIP: ABNORMAL
NITRITE UR QL STRIP: NEGATIVE
PH UR STRIP: 7 [PH] (ref 5–8)
PROT UR QL STRIP: NEGATIVE
RBC #/AREA URNS HPF: ABNORMAL /HPF
SERVICE CMNT-IMP: ABNORMAL
SP GR UR STRIP: 1.01 (ref 1–1.03)
SQUAMOUS #/AREA URNS HPF: ABNORMAL /HPF
WBC #/AREA URNS HPF: ABNORMAL /HPF
YEAST #/AREA URNS HPF: ABNORMAL /HPF

## 2025-05-06 NOTE — TELEPHONE ENCOUNTER
Patient stating that she needs a letter, Job is requesting how long shell be off of work and able to be back to work after her Sx  If any questions please call patient    Letter was wrote and sent through My Chart

## 2025-05-06 NOTE — LETTER
May 6, 2025     Patient: Luda Song   YOB: 1955   Date of Visit: 5/6/2025       To Whom It May Concern:    Luda Song Will be off work the date of her Surgery on 5/20/25 till her follow up appointment on 6/5/2025 or till Doctor clears her to go back.    If you have any questions or concerns, please don't hesitate to call.         Sincerely,     Susana ERNST MA

## 2025-05-09 ENCOUNTER — APPOINTMENT (OUTPATIENT)
Dept: PRIMARY CARE | Facility: CLINIC | Age: 70
End: 2025-05-09
Payer: MEDICARE

## 2025-05-19 DIAGNOSIS — M65.30 ACQUIRED TRIGGER FINGER: Primary | ICD-10-CM

## 2025-05-19 RX ORDER — TRAMADOL HYDROCHLORIDE 50 MG/1
50 TABLET, FILM COATED ORAL EVERY 8 HOURS PRN
Qty: 5 TABLET | Refills: 0 | Status: SHIPPED | OUTPATIENT
Start: 2025-05-19

## 2025-05-19 RX ORDER — IBUPROFEN 800 MG/1
800 TABLET, FILM COATED ORAL 3 TIMES DAILY
Qty: 15 TABLET | Refills: 0 | Status: SHIPPED | OUTPATIENT
Start: 2025-05-19 | End: 2025-05-24

## 2025-05-20 PROCEDURE — 26055 INCISE FINGER TENDON SHEATH: CPT | Performed by: STUDENT IN AN ORGANIZED HEALTH CARE EDUCATION/TRAINING PROGRAM

## 2025-06-05 ENCOUNTER — APPOINTMENT (OUTPATIENT)
Dept: ORTHOPEDIC SURGERY | Facility: CLINIC | Age: 70
End: 2025-06-05
Payer: MEDICARE

## 2025-06-05 DIAGNOSIS — M65.30 ACQUIRED TRIGGER FINGER: Primary | ICD-10-CM

## 2025-06-05 PROCEDURE — 99024 POSTOP FOLLOW-UP VISIT: CPT | Performed by: STUDENT IN AN ORGANIZED HEALTH CARE EDUCATION/TRAINING PROGRAM

## 2025-06-05 NOTE — PROGRESS NOTES
2 weeks S/p left ring TFR. Doing well. Denies numbness or tingling.     Physical Examination:  The patient appears to be their stated age, is in no apparent distress, and is oriented x3. The patients mood and affect are appropriate. The patients gait is normal. The examination of the limb in question was performed in comparison to the contralateral limb.    Incision c/d/I  AIN, PIN, ulnar intact  SILT A/R/U/M  Hand wwp    Assessment:  2 weeks post op    Plan:   The patient will progress to weightbearing to tolerance with theupper extremity.  We reviewed range of motion recovery exercises to the digits and wrist, scar massage and desensitization techniques.  The patient will work on these on her own with home exercise program.  Follow up with our office on an as-needed basis.  We did offer a formal therapy referral.  They declined in favor of home exercise program.   The patient verbalized agreement and understanding of plan for care.  All questions were answered at today's visit.          Susana Tyson MD

## 2025-06-05 NOTE — LETTER
[facility]  [address] [place] [id number]  PHONE: [phone]  FAX: [phone]      June 5, 2025    Patient: Luda Song   YOB: 1955   Date of Visit: 6/5/2025       To Whom It May Concern,    Luda Song was seen in my clinic on 6/5/2025, she has been advised she may return to work on 6/24/2025, with no restrictions.    If you have any questions or concerns, please contact the office by phone or fax.  Phone: [phone] or Fax: [phone]        Sincerely,      Susana Alas MD

## 2025-07-17 ENCOUNTER — TELEPHONE (OUTPATIENT)
Dept: PRIMARY CARE | Facility: CLINIC | Age: 70
End: 2025-07-17

## 2025-07-17 DIAGNOSIS — Z12.31 BREAST CANCER SCREENING BY MAMMOGRAM: ICD-10-CM

## 2025-08-21 ENCOUNTER — APPOINTMENT (OUTPATIENT)
Dept: RADIOLOGY | Facility: HOSPITAL | Age: 70
End: 2025-08-21
Payer: MEDICARE

## 2025-08-21 VITALS — BODY MASS INDEX: 29.1 KG/M2 | WEIGHT: 154 LBS

## 2025-08-21 DIAGNOSIS — Z12.31 BREAST CANCER SCREENING BY MAMMOGRAM: ICD-10-CM

## 2025-08-21 PROCEDURE — 77063 BREAST TOMOSYNTHESIS BI: CPT

## 2025-08-21 PROCEDURE — 77063 BREAST TOMOSYNTHESIS BI: CPT | Performed by: RADIOLOGY

## 2025-08-21 PROCEDURE — 77067 SCR MAMMO BI INCL CAD: CPT | Performed by: RADIOLOGY

## 2026-04-24 ENCOUNTER — APPOINTMENT (OUTPATIENT)
Dept: PRIMARY CARE | Facility: CLINIC | Age: 71
End: 2026-04-24
Payer: MEDICARE

## (undated) DEVICE — TUBE SET 96 MM 64 MM H2O PERISTALTIC STD AUX CHANNEL

## (undated) DEVICE — ENDO CARRY-ON PROCEDURE KIT: Brand: ENDO CARRY-ON PROCEDURE KIT

## (undated) DEVICE — GLOVE ORANGE PI 8 1/2   MSG9085

## (undated) DEVICE — SINGLE PORT MANIFOLD: Brand: NEPTUNE 2

## (undated) DEVICE — Device: Brand: ENDO SMARTCAP

## (undated) DEVICE — BRUSH ENDO COMBO

## (undated) DEVICE — TUBING, SUCTION, 1/4" X 10', STRAIGHT: Brand: MEDLINE

## (undated) DEVICE — FORCEPS BX L240CM JAW DIA2.8MM L CAP W/ NDL MIC MESH TOOTH

## (undated) DEVICE — GLOVE ORTHO 8   MSG9480